# Patient Record
Sex: FEMALE | Race: WHITE | Employment: OTHER | URBAN - METROPOLITAN AREA
[De-identification: names, ages, dates, MRNs, and addresses within clinical notes are randomized per-mention and may not be internally consistent; named-entity substitution may affect disease eponyms.]

---

## 2017-02-24 RX ORDER — FLUOXETINE 10 MG/1
TABLET ORAL
Qty: 90 TAB | Refills: 1 | Status: SHIPPED | OUTPATIENT
Start: 2017-02-24 | End: 2017-11-08 | Stop reason: SDUPTHER

## 2017-02-24 RX ORDER — OMEPRAZOLE 40 MG/1
CAPSULE, DELAYED RELEASE ORAL
Qty: 90 CAP | Refills: 1 | Status: SHIPPED | OUTPATIENT
Start: 2017-02-24 | End: 2018-02-07 | Stop reason: SDUPTHER

## 2017-04-26 ENCOUNTER — OFFICE VISIT (OUTPATIENT)
Dept: INTERNAL MEDICINE CLINIC | Age: 72
End: 2017-04-26

## 2017-04-26 VITALS
TEMPERATURE: 97.8 F | RESPIRATION RATE: 18 BRPM | BODY MASS INDEX: 26.28 KG/M2 | HEIGHT: 61 IN | WEIGHT: 139.2 LBS | HEART RATE: 110 BPM | SYSTOLIC BLOOD PRESSURE: 136 MMHG | OXYGEN SATURATION: 97 % | DIASTOLIC BLOOD PRESSURE: 80 MMHG

## 2017-04-26 DIAGNOSIS — J06.9 ACUTE URI: Primary | ICD-10-CM

## 2017-04-26 RX ORDER — AZITHROMYCIN 250 MG/1
250 TABLET, FILM COATED ORAL SEE ADMIN INSTRUCTIONS
Qty: 6 TAB | Refills: 0 | Status: SHIPPED | OUTPATIENT
Start: 2017-04-26 | End: 2017-05-01

## 2017-04-26 NOTE — PROGRESS NOTES
1. Have you been to the ER, urgent care clinic since your last visit? Hospitalized since your last visit? No    2. Have you seen or consulted any other health care providers outside of the [facility] since your last visit? Include any pap smears or colon screening.  No   Chief Complaint   Patient presents with    Cold Symptoms     x 4 days     Not Fasting

## 2017-04-26 NOTE — MR AVS SNAPSHOT
Visit Information Date & Time Provider Department Dept. Phone Encounter #  
 4/26/2017  4:30 PM Ferdinand [facility] MD [facility] [phone] [id number] Your Appointments 5/19/2017 10:30 AM  
ROUTINE CARE with Ferdinand Kang MD  
[facility] [address]) Appt Note: Follow up visit; Follow up visit; F/U; f/u  
 [place] [address] [facility] [address]  
[facility] [address] [facility] [address] [address] Upcoming Health Maintenance Date Due Hepatitis C Screening 1945 OSTEOPOROSIS SCREENING (DEXA) 12/19/2011 Pneumococcal 65+ Low/Medium Risk (1 of 2 - PCV13) 12/19/2011 MEDICARE YEARLY EXAM 12/19/2011 GLAUCOMA SCREENING Q2Y 6/19/2016 INFLUENZA AGE 9 TO ADULT 8/1/2016 BREAST CANCER SCRN MAMMOGRAM 12/2/2017 COLONOSCOPY 9/30/2020 DTaP/Tdap/Td series (2 - Td) 11/19/2020 Allergies as of 4/26/2017  Review Complete On: 4/26/2017 By: Nadiya Medicine Severity Noted Reaction Type Reactions Hydrocodone  01/17/2016    Nausea and Vomiting Lipitor [Atorvastatin]  02/13/2013    Myalgia Pcn [Penicillins]  10/24/2011    Swelling Hives Simvastatin  10/24/2011    Other (comments) Abdomina pain  
 Sulfa (Sulfonamide Antibiotics)  10/24/2011    Swelling  
 lips Current Immunizations  Reviewed on 11/19/2012 Name Date Influenza Vaccine Split 11/19/2012, 10/24/2011 TDAP Vaccine 11/19/2010 Not reviewed this visit Vitals BP Pulse Temp Resp Height(growth percentile) Weight(growth percentile) 136/80 (BP 1 Location: Left arm, BP Patient Position: At rest) (!) 110 97.8 °F (36.6 °C) (Oral) 18 5' 1\" (1.549 m) 139 lb 3.2 oz (63.1 kg) SpO2 BMI OB Status Smoking Status 97% 26.3 kg/m2 Postmenopausal Never Smoker BMI and BSA Data Body Mass Index Body Surface Area  
 26.3 kg/m 2 1.65 m 2 Preferred Pharmacy Pharmacy Name Phone [address], [facility] [address] Your Updated Medication List  
  
   
This list is accurate as of: 4/26/17  5:10 PM.  Always use your most recent med list.  
  
  
  
  
 azithromycin 250 mg tablet Commonly known as:  Jess Nikolay Take 1 Tab by mouth See Admin Instructions for 5 days. CANASA 1,000 mg suppository Generic drug:  mesalamine Insert  into rectum two (2) times a day. FLUoxetine 10 mg tablet Commonly known as:  PROzac TAKE 1 TABLET ONE TIME DAILY  
  
 fluticasone 50 mcg/actuation nasal spray Commonly known as:  Creasie Cockayne 2 Sprays by Both Nostrils route daily. HYDROmorphone 2 mg tablet Commonly known as:  DILAUDID Take 1 Tab by mouth every three (3) hours as needed for Pain. Max Daily Amount: 16 mg.  
  
 omeprazole 40 mg capsule Commonly known as:  PRILOSEC  
TAKE 1 CAPSULE ONE TIME DAILY  
  
 valACYclovir 1 gram tablet Commonly known as:  VALTREX Take 1 Tab by mouth three (3) times daily. Prescriptions Sent to Pharmacy Refills  
 azithromycin (ZITHROMAX) 250 mg tablet 0 Sig: Take 1 Tab by mouth See Admin Instructions for 5 days. Class: Normal  
 Pharmacy: [address] [address] [address] [place] [address] #: [phone] Route: Oral  
  
Introducing [facility] & HEALTH SERVICES! Dear Shari Grullon: Thank you for requesting a [employer] account. Our records indicate that you have previously registered for a [employer] account but its currently inactive. Please call our [employer] support line at [phone]. Additional Information If you have questions, please visit the Frequently Asked Questions section of the [employer] website at [email or web address] Remember, [employer] is NOT to be used for urgent needs. For medical emergencies, dial 911. Now available from your iPhone and Android! Please provide this summary of care documentation to your next provider. Your primary care clinician is listed as [id number] [address] [place] [address] [address]. If you have any questions after today's visit, please call [phone].

## 2017-04-28 NOTE — PROGRESS NOTES
HISTORY OF PRESENT ILLNESS  Pa Sung is a 80 y.o. female. Cold Symptoms       Here for a cold that started four days ago. She no longer has sore throat and no fever. She is congested and responding to otc meds. Her right ear is popping but no pain. She is going out of town so wants to feel better. Past Medical History:   Diagnosis Date    Allergic rhinitis     CAD (coronary artery disease)     by EBT    Depression     GERD (gastroesophageal reflux disease)     Iron deficiency anemia     Ulcerative colitis (HCC)      Current Outpatient Prescriptions   Medication Sig    azithromycin (ZITHROMAX) 250 mg tablet Take 1 Tab by mouth See Admin Instructions for 5 days.  FLUoxetine (PROZAC) 10 mg tablet TAKE 1 TABLET ONE TIME DAILY    omeprazole (PRILOSEC) 40 mg capsule TAKE 1 CAPSULE ONE TIME DAILY    mesalamine (CANASA) 1,000 mg suppository Insert  into rectum two (2) times a day.  fluticasone (FLONASE) 50 mcg/actuation nasal spray 2 Sprays by Both Nostrils route daily.  HYDROmorphone (DILAUDID) 2 mg tablet Take 1 Tab by mouth every three (3) hours as needed for Pain. Max Daily Amount: 16 mg.    valACYclovir (VALTREX) 1 gram tablet Take 1 Tab by mouth three (3) times daily. No current facility-administered medications for this visit. Review of Systems   All other systems reviewed and are negative. Visit Vitals    /80 (BP 1 Location: Left arm, BP Patient Position: At rest)    Pulse (!) 110    Temp 97.8 °F (36.6 °C) (Oral)    Resp 18    Ht 5' 1\" (1.549 m)    Wt 139 lb 3.2 oz (63.1 kg)    SpO2 97%    BMI 26.3 kg/m2       Physical Exam   Constitutional: She appears well-developed and well-nourished. HENT:   Left Ear: External ear normal.   Mouth/Throat: Oropharynx is clear and moist.   Right TM is dull. Cardiovascular: Normal rate, regular rhythm and normal heart sounds. Pulmonary/Chest: Effort normal and breath sounds normal. No respiratory distress.  She has no wheezes. She has no rales. Psychiatric: She has a normal mood and affect. Her behavior is normal. Judgment and thought content normal.   Nursing note and vitals reviewed. ASSESSMENT and PLAN  Jose Toth was seen today for cold symptoms. Diagnoses and all orders for this visit:    Acute URI  Continue OTC meds for viral syndrome . Use antibiotic if not better in a week. Other orders  -     azithromycin (ZITHROMAX) 250 mg tablet; Take 1 Tab by mouth See Admin Instructions for 5 days.     Reviewed plan of care with the patient who has provided input and agrees with the goals

## 2017-05-19 ENCOUNTER — HOSPITAL ENCOUNTER (OUTPATIENT)
Dept: LAB | Age: 72
Discharge: HOME | End: 2017-05-19
Payer: MEDICARE

## 2017-05-19 ENCOUNTER — OFFICE VISIT (OUTPATIENT)
Dept: INTERNAL MEDICINE CLINIC | Age: 72
End: 2017-05-19

## 2017-05-19 VITALS
SYSTOLIC BLOOD PRESSURE: 143 MMHG | HEIGHT: 61 IN | HEART RATE: 88 BPM | RESPIRATION RATE: 16 BRPM | DIASTOLIC BLOOD PRESSURE: 74 MMHG | OXYGEN SATURATION: 97 % | WEIGHT: 140.2 LBS | BODY MASS INDEX: 26.47 KG/M2 | TEMPERATURE: 98.1 F

## 2017-05-19 DIAGNOSIS — K52.9 IBD (INFLAMMATORY BOWEL DISEASE): ICD-10-CM

## 2017-05-19 DIAGNOSIS — J30.1 NON-SEASONAL ALLERGIC RHINITIS DUE TO POLLEN: ICD-10-CM

## 2017-05-19 DIAGNOSIS — Z78.9 LIVING WILL IN PLACE: ICD-10-CM

## 2017-05-19 DIAGNOSIS — Z13.39 SCREENING FOR ALCOHOLISM: ICD-10-CM

## 2017-05-19 DIAGNOSIS — J00 ACUTE NASOPHARYNGITIS: ICD-10-CM

## 2017-05-19 DIAGNOSIS — Z23 NEED FOR 23-POLYVALENT PNEUMOCOCCAL POLYSACCHARIDE VACCINE: ICD-10-CM

## 2017-05-19 DIAGNOSIS — Z23 NEED FOR VACCINATION WITH 13-POLYVALENT PNEUMOCOCCAL CONJUGATE VACCINE: ICD-10-CM

## 2017-05-19 DIAGNOSIS — K21.9 GASTROESOPHAGEAL REFLUX DISEASE WITHOUT ESOPHAGITIS: ICD-10-CM

## 2017-05-19 DIAGNOSIS — Z00.00 MEDICARE ANNUAL WELLNESS VISIT, SUBSEQUENT: Primary | ICD-10-CM

## 2017-05-19 DIAGNOSIS — F32.9 REACTIVE DEPRESSION: ICD-10-CM

## 2017-05-19 DIAGNOSIS — E78.00 PURE HYPERCHOLESTEROLEMIA: ICD-10-CM

## 2017-05-19 DIAGNOSIS — Z00.00 ROUTINE GENERAL MEDICAL EXAMINATION AT A HEALTH CARE FACILITY: ICD-10-CM

## 2017-05-19 DIAGNOSIS — R94.39 EBT (ELECTRON BEAM TOMOGRAPHY) ABNORMAL: ICD-10-CM

## 2017-05-19 PROBLEM — Z71.89 LIVING WILL, COUNSELING/DISCUSSION: Status: ACTIVE | Noted: 2017-05-19

## 2017-05-19 PROCEDURE — 36415 COLL VENOUS BLD VENIPUNCTURE: CPT

## 2017-05-19 PROCEDURE — 85025 COMPLETE CBC W/AUTO DIFF WBC: CPT

## 2017-05-19 PROCEDURE — 80061 LIPID PANEL: CPT

## 2017-05-19 PROCEDURE — 80053 COMPREHEN METABOLIC PANEL: CPT

## 2017-05-19 NOTE — PROGRESS NOTES
This is a Subsequent Medicare Annual Wellness Visit providing Personalized Prevention Plan Services (PPPS) (Performed 12 months after initial AWV and PPPS )    I have reviewed the patient's medical history in detail and updated the computerized patient record. History   Here for mwv. She needs vaccines. Her mammogram and c-scope are current. She has cad and has not seen cardiology . She is intolerant of asa and does not want statins. Her uc and gerd are stable. She has another cold that started Monday. Her sister was sick. She sees gyn, gi, ophthalmology. Past Medical History:   Diagnosis Date    Allergic rhinitis     CAD (coronary artery disease)     by EBT    Depression     GERD (gastroesophageal reflux disease)     Iron deficiency anemia     Ulcerative colitis ([facility] [address]       Past Surgical History:   Procedure Laterality Date    HX COLONOSCOPY  2013    sigmoid colitis 2018    HX ENDOSCOPY  2013     Current Outpatient Prescriptions   Medication Sig Dispense Refill    FLUoxetine (PROZAC) 10 mg tablet TAKE 1 TABLET ONE TIME DAILY 90 Tab 1    omeprazole (PRILOSEC) 40 mg capsule TAKE 1 CAPSULE ONE TIME DAILY 90 Cap 1    mesalamine (CANASA) 1,000 mg suppository Insert  into rectum two (2) times a day.  fluticasone (FLONASE) 50 mcg/actuation nasal spray 2 Sprays by Both Nostrils route daily.  1 Bottle 11     Allergies   Allergen Reactions    Hydrocodone Nausea and Vomiting    Lipitor [Atorvastatin] Myalgia    Pcn [Penicillins] Swelling     Hives      Simvastatin Other (comments)     Abdomina pain    Sulfa (Sulfonamide Antibiotics) Swelling     lips     Family History   Problem Relation Age of Onset    Heart Disease Mother     Heart Disease Father     Cancer Sister      breast     Social History   Substance Use Topics    Smoking status: Never Smoker    Smokeless tobacco: Never Used    Alcohol use No     Patient Active Problem List   Diagnosis Code    HLD (hyperlipidemia) E78.5    EBT (electron beam tomography) abnormal R94.39    Depression F32.9    Iron deficiency anemia D50.9    IBD (inflammatory bowel disease) K52.9    Allergic rhinitis J30.9    Living will, counseling/discussion Z71.89    Living will in place Z78.9       Depression Risk Factor Screening:   No flowsheet data found. Alcohol Risk Factor Screening: On any occasion during the past 3 months, have you had more than 3 drinks containing alcohol? No    Do you average more than 7 drinks per week? No      Functional Ability and Level of Safety:     Hearing Loss   none    Activities of Daily Living   Self-care. Requires assistance with: no ADLs    Fall Risk     Fall Risk Assessment, last 12 mths 5/19/2017   Able to walk? Yes   Fall in past 12 months? No   Fall with injury? -   Number of falls in past 12 months -   Fall Risk Score -     Abuse Screen   Patient is not abused    Review of Systems   A comprehensive review of systems was negative except for that written in the HPI. Physical Examination     Evaluation of Cognitive Function:  Mood/affect:  happy  Appearance: age appropriate  Family member/caregiver input: none    Visit Vitals    /74 (BP 1 Location: Left arm, BP Patient Position: At rest)    Pulse 88    Temp 98.1 °F (36.7 °C) (Oral)    Resp 16    Ht 5' 1\" (1.549 m)    Wt 140 lb 3.2 oz (63.6 kg)    SpO2 97%    BMI 26.49 kg/m2     General appearance: alert, cooperative, no distress, appears stated age  Lungs: clear to auscultation bilaterally  Heart: regular rate and rhythm, S1, S2 normal, no murmur, click, rub or gallop    Patient Care Team:  Cat Mariano MD as PCP - General (Internal Medicine)    Advice/Referrals/Counseling   Education and counseling provided:  Are appropriate based on today's review and evaluation  End-of-Life planning (with patient's consent)      Assessment/Plan   Leandra Barajas was seen today for cold symptoms and other.     Diagnoses and all orders for this visit:    Medicare annual wellness visit, subsequent    IBD (inflammatory bowel disease)  -     CBC WITH AUTOMATED DIFF  The current medical regimen is effective;  continue present plan and medications. Reactive depression  The current medical regimen is effective;  continue present plan and medications. Non-seasonal allergic rhinitis due to pollen    EBT (electron beam tomography) abnormal  -     Cancel: REFERRAL TO CARDIOLOGY  -     REFERRAL TO CARDIOLOGY    Pure hypercholesterolemia  -     METABOLIC PANEL, COMPREHENSIVE  -     LIPID PANEL    Gastroesophageal reflux disease without esophagitis  The current medical regimen is effective;  continue present plan and medications. Acute nasopharyngitis  Call ten days PRN. Need for vaccination with 13-polyvalent pneumococcal conjugate vaccine  Rx given. Need for 23-polyvalent pneumococcal polysaccharide vaccine    Living will in place    Reviewed plan of care with the patient who has provided input and agrees with the goals  .

## 2017-05-19 NOTE — MR AVS SNAPSHOT
Visit Information Date & Time Provider Department Dept. Phone Encounter #  
 5/19/2017 10:30 AM Isa Dunn MD [facility] [phone] [id number] Follow-up Instructions Return in about 6 months (around 11/19/2017). Upcoming Health Maintenance Date Due Hepatitis C Screening 1945 OSTEOPOROSIS SCREENING (DEXA) 12/19/2011 Pneumococcal 65+ Low/Medium Risk (1 of 2 - PCV13) 12/19/2011 MEDICARE YEARLY EXAM 12/19/2011 GLAUCOMA SCREENING Q2Y 6/19/2016 INFLUENZA AGE 9 TO ADULT 8/1/2017 BREAST CANCER SCRN MAMMOGRAM 12/2/2017 COLONOSCOPY 9/30/2020 DTaP/Tdap/Td series (2 - Td) 11/19/2020 Allergies as of 5/19/2017  Review Complete On: 5/19/2017 By: Angie Canseco Severity Noted Reaction Type Reactions Hydrocodone  01/17/2016    Nausea and Vomiting Lipitor [Atorvastatin]  02/13/2013    Myalgia Pcn [Penicillins]  10/24/2011    Swelling Hives Simvastatin  10/24/2011    Other (comments) Abdomina pain  
 Sulfa (Sulfonamide Antibiotics)  10/24/2011    Swelling  
 lips Current Immunizations  Reviewed on 11/19/2012 Name Date Influenza Vaccine Split 11/19/2012, 10/24/2011 TDAP Vaccine 11/19/2010 Not reviewed this visit You Were Diagnosed With   
  
 Codes Comments Medicare annual wellness visit, subsequent    -  Primary ICD-10-CM: Z00.00 ICD-9-CM: V70.0 IBD (inflammatory bowel disease)     ICD-10-CM: K52.9 ICD-9-CM: 558.9 Reactive depression     ICD-10-CM: F32.9 ICD-9-CM: 300.4 Non-seasonal allergic rhinitis due to pollen     ICD-10-CM: J30.1 ICD-9-CM: 477.0   
 EBT (electron beam tomography) abnormal     ICD-10-CM: R94.39 
ICD-9-CM: 794.39 Pure hypercholesterolemia     ICD-10-CM: E78.00 ICD-9-CM: 272.0 Gastroesophageal reflux disease without esophagitis     ICD-10-CM: K21.9 ICD-9-CM: 530.81 Acute nasopharyngitis     ICD-10-CM: Giulia Selby ICD-9-CM: 958 Need for vaccination with 13-polyvalent pneumococcal conjugate vaccine     ICD-10-CM: K76 ICD-9-CM: V03.82 Need for 23-polyvalent pneumococcal polysaccharide vaccine     ICD-10-CM: U07 ICD-9-CM: V03.82 Living will in place     ICD-10-CM: Z78.9 ICD-9-CM: V49.89 Vitals BP Pulse Temp Resp Height(growth percentile) Weight(growth percentile) 143/74 (BP 1 Location: Left arm, BP Patient Position: At rest) 88 98.1 °F (36.7 °C) (Oral) 16 5' 1\" (1.549 m) 140 lb 3.2 oz (63.6 kg) SpO2 BMI OB Status Smoking Status 97% 26.49 kg/m2 Postmenopausal Never Smoker BMI and BSA Data Body Mass Index Body Surface Area  
 26.49 kg/m 2 1.65 m 2 Preferred Pharmacy Pharmacy Name Phone [address], [address] Your Updated Medication List  
  
   
This list is accurate as of: 5/19/17 10:56 AM.  Always use your most recent med list.  
  
  
  
  
 CANASA 1,000 mg suppository Generic drug:  mesalamine Insert  into rectum two (2) times a day. FLUoxetine 10 mg tablet Commonly known as:  PROzac TAKE 1 TABLET ONE TIME DAILY  
  
 fluticasone 50 mcg/actuation nasal spray Commonly known as:  Creasie Cockayne 2 Sprays by Both Nostrils route daily. omeprazole 40 mg capsule Commonly known as:  PRILOSEC  
TAKE 1 CAPSULE ONE TIME DAILY We Performed the Following CBC WITH AUTOMATED DIFF [68489 CPT(R)] LIPID PANEL [50295 CPT(R)] METABOLIC PANEL, COMPREHENSIVE [44225 CPT(R)] REFERRAL TO CARDIOLOGY [VQS07 Custom] Follow-up Instructions Return in about 6 months (around 11/19/2017). Referral Information Referral ID Referred By Referred To  
  
 [id number] Nabil Donnelly. Esther [address] MD Satish Molina  [address] Phone: [phone] Visits Status Start Date End Date 1 New Request 5/19/17 5/19/18 If your referral has a status of pending review or denied, additional information will be sent to support the outcome of this decision. Introducing [facility] & HEALTH SERVICES! Dear Meg Mary: Thank you for requesting a [employer] account. Our records indicate that you have previously registered for a [employer] account but its currently inactive. Please call our [employer] support line at [phone]. Additional Information If you have questions, please visit the Frequently Asked Questions section of the [employer] website at [email or web address] Remember, [employer] is NOT to be used for urgent needs. For medical emergencies, dial 911. Now available from your iPhone and Android! Please provide this summary of care documentation to your next provider. Your primary care clinician is listed as [id number] [address] [place] [address] [address]. If you have any questions after today's visit, please call [phone].

## 2017-05-19 NOTE — PROGRESS NOTES
1. Have you been to the ER, urgent care clinic since your last visit? Hospitalized since your last visit? No    2. Have you seen or consulted any other health care providers outside of the [address] [place] [address] since your last visit? Include any pap smears or colon screening.  No   Chief Complaint   Patient presents with    Cold Symptoms    Other     follow up     Fasting

## 2017-05-20 LAB
ALBUMIN SERPL-MCNC: 4.2 G/DL (ref 3.5–4.8)
ALBUMIN/GLOB SERPL: 1.6 {RATIO} (ref 1.2–2.2)
ALP SERPL-CCNC: 64 IU/L (ref 39–117)
ALT SERPL-CCNC: 18 IU/L (ref 0–32)
AST SERPL-CCNC: 22 IU/L (ref 0–40)
BASOPHILS # BLD AUTO: 0 X10E3/UL (ref 0–0.2)
BASOPHILS NFR BLD AUTO: 0 %
BILIRUB SERPL-MCNC: 0.4 MG/DL (ref 0–1.2)
BUN SERPL-MCNC: 11 MG/DL (ref 8–27)
BUN/CREAT SERPL: 15 (ref 12–28)
CALCIUM SERPL-MCNC: 9.6 MG/DL (ref 8.7–10.3)
CHLORIDE SERPL-SCNC: 98 MMOL/L (ref 96–106)
CHOLEST SERPL-MCNC: 230 MG/DL (ref 100–199)
CO2 SERPL-SCNC: 25 MMOL/L (ref 18–29)
CREAT SERPL-MCNC: 0.75 MG/DL (ref 0.57–1)
EOSINOPHIL # BLD AUTO: 0.1 X10E3/UL (ref 0–0.4)
EOSINOPHIL NFR BLD AUTO: 1 %
ERYTHROCYTE [DISTWIDTH] IN BLOOD BY AUTOMATED COUNT: 14.2 % (ref 12.3–15.4)
GLOBULIN SER CALC-MCNC: 2.7 G/DL (ref 1.5–4.5)
GLUCOSE SERPL-MCNC: 88 MG/DL (ref 65–99)
HCT VFR BLD AUTO: 41.6 % (ref 34–46.6)
HDLC SERPL-MCNC: 71 MG/DL
HGB BLD-MCNC: 13.4 G/DL (ref 11.1–15.9)
IMM GRANULOCYTES # BLD: 0 X10E3/UL (ref 0–0.1)
IMM GRANULOCYTES NFR BLD: 0 %
INTERPRETATION, 910389: NORMAL
LDLC SERPL CALC-MCNC: 133 MG/DL (ref 0–99)
LYMPHOCYTES # BLD AUTO: 2.1 X10E3/UL (ref 0.7–3.1)
LYMPHOCYTES NFR BLD AUTO: 32 %
MCH RBC QN AUTO: 28.1 PG (ref 26.6–33)
MCHC RBC AUTO-ENTMCNC: 32.2 G/DL (ref 31.5–35.7)
MCV RBC AUTO: 87 FL (ref 79–97)
MONOCYTES # BLD AUTO: 0.6 X10E3/UL (ref 0.1–0.9)
MONOCYTES NFR BLD AUTO: 9 %
NEUTROPHILS # BLD AUTO: 3.9 X10E3/UL (ref 1.4–7)
NEUTROPHILS NFR BLD AUTO: 58 %
PLATELET # BLD AUTO: 282 X10E3/UL (ref 150–379)
POTASSIUM SERPL-SCNC: 4.5 MMOL/L (ref 3.5–5.2)
PROT SERPL-MCNC: 6.9 G/DL (ref 6–8.5)
RBC # BLD AUTO: 4.77 X10E6/UL (ref 3.77–5.28)
SODIUM SERPL-SCNC: 140 MMOL/L (ref 134–144)
TRIGL SERPL-MCNC: 132 MG/DL (ref 0–149)
VLDLC SERPL CALC-MCNC: 26 MG/DL (ref 5–40)
WBC # BLD AUTO: 6.8 X10E3/UL (ref 3.4–10.8)

## 2017-06-13 ENCOUNTER — OFFICE VISIT (OUTPATIENT)
Dept: CARDIOLOGY CLINIC | Age: 72
End: 2017-06-13

## 2017-06-13 VITALS
HEART RATE: 95 BPM | OXYGEN SATURATION: 98 % | RESPIRATION RATE: 16 BRPM | DIASTOLIC BLOOD PRESSURE: 70 MMHG | HEIGHT: 61 IN | SYSTOLIC BLOOD PRESSURE: 130 MMHG | WEIGHT: 137 LBS | BODY MASS INDEX: 25.86 KG/M2

## 2017-06-13 DIAGNOSIS — E78.00 PURE HYPERCHOLESTEROLEMIA: ICD-10-CM

## 2017-06-13 DIAGNOSIS — R00.0 RACING HEART BEAT: ICD-10-CM

## 2017-06-13 DIAGNOSIS — R00.2 PALPITATIONS: ICD-10-CM

## 2017-06-13 DIAGNOSIS — Z00.00 ROUTINE CHECK-UP: ICD-10-CM

## 2017-06-13 DIAGNOSIS — R93.1 AGATSTON CAC SCORE, <100: Primary | ICD-10-CM

## 2017-06-13 NOTE — MR AVS SNAPSHOT
Visit Information Date & Time Provider Department Dept. Phone Encounter #  
 6/13/2017  2:40 PM Sreedhar Bryant MD [facility] [phone] [id number] Follow-up Instructions Return in about 1 year (around 6/13/2018). Upcoming Health Maintenance Date Due Hepatitis C Screening 1945 OSTEOPOROSIS SCREENING (DEXA) 12/19/2011 Pneumococcal 65+ Low/Medium Risk (1 of 2 - PCV13) 12/19/2011 GLAUCOMA SCREENING Q2Y 6/19/2016 INFLUENZA AGE 9 TO ADULT 8/1/2017 BREAST CANCER SCRN MAMMOGRAM 12/2/2017 MEDICARE YEARLY EXAM 5/20/2018 COLONOSCOPY 9/30/2020 DTaP/Tdap/Td series (2 - Td) 11/19/2020 Allergies as of 6/13/2017  Review Complete On: 6/13/2017 By: Sreedhar Bryant MD  
  
 Severity Noted Reaction Type Reactions Aspirin High 06/13/2017    Other (comments) Has ulcerative proctitis Codeine  06/13/2017    Other (comments) Headaches? Hydrocodone  01/17/2016    Nausea and Vomiting Lipitor [Atorvastatin]  02/13/2013    Myalgia Pcn [Penicillins]  10/24/2011    Swelling Hives Simvastatin  10/24/2011    Other (comments) Abdomina pain  
 Sulfa (Sulfonamide Antibiotics)  10/24/2011    Swelling  
 lips Current Immunizations  Reviewed on 11/19/2012 Name Date Influenza Vaccine Split 11/19/2012, 10/24/2011 TDAP Vaccine 11/19/2010 Not reviewed this visit You Were Diagnosed With   
  
 Codes Comments Agatston CAC score, <100    -  Primary ICD-10-CM: R93.1 ICD-9-CM: 793.2 Pure hypercholesterolemia     ICD-10-CM: E78.00 ICD-9-CM: 272.0 Racing heart beat     ICD-10-CM: R00.0 ICD-9-CM: 785.0 Routine check-up     ICD-10-CM: Z00.00 ICD-9-CM: V70.0 Vitals BP Pulse Resp Height(growth percentile) Weight(growth percentile) SpO2  
 130/70 (BP 1 Location: Left arm, BP Patient Position: Sitting) 95 16 5' 1\" (1.549 m) 137 lb (62.1 kg) 98% BMI OB Status Smoking Status 25.89 kg/m2 Postmenopausal Never Smoker Vitals History BMI and BSA Data Body Mass Index Body Surface Area  
 25.89 kg/m 2 1.63 m 2 Preferred Pharmacy Pharmacy Name Phone [address], [address] Your Updated Medication List  
  
   
This list is accurate as of: 6/13/17  3:01 PM.  Always use your most recent med list.  
  
  
  
  
 CANASA 1,000 mg suppository Generic drug:  mesalamine Insert  into rectum two (2) times a day. FLUoxetine 10 mg tablet Commonly known as:  PROzac TAKE 1 TABLET ONE TIME DAILY  
  
 fluticasone 50 mcg/actuation nasal spray Commonly known as:  Fredick [place] 2 Sprays by Both Nostrils route daily. omeprazole 40 mg capsule Commonly known as:  PRILOSEC  
TAKE 1 CAPSULE ONE TIME DAILY We Performed the Following AMB POC EKG ROUTINE W/ 12 LEADS, INTER & REP [23094 CPT(R)] Follow-up Instructions Return in about 1 year (around 6/13/2018). Introducing [facility] & HEALTH SERVICES! Dear Carter Ventura: Thank you for requesting a [employer] account. Our records indicate that you already have an active [employer] account. You can access your account anytime at [email or web address] Did you know that you can access your hospital and ER discharge instructions at any time in [employer]? You can also review all of your test results from your hospital stay or ER visit. Additional Information If you have questions, please visit the Frequently Asked Questions section of the [employer] website at [email or web address] Remember, [employer] is NOT to be used for urgent needs. For medical emergencies, dial 911. Now available from your iPhone and Android! Please provide this summary of care documentation to your next provider. Your primary care clinician is listed as [phone] [address] [place] [address] [address].  If you have any questions after today's visit, please call [phone].

## 2017-06-13 NOTE — PROGRESS NOTES
HISTORY OF PRESENT ILLNESS  Burt Webb is a 80 y.o. female     SUMMARY:   Problem List  Date Reviewed: 6/13/2017          Codes Class Noted    Agatston CAC score, <100 ICD-10-CM: R93.1  ICD-9-CM: 793.2  6/13/2017    Overview Addendum 6/13/2017  6:59 AM by Yann Napoles MD     9/10 calcium score 45, 70th percentile  11/12 normal exercise cardiolyte stress test, lvef 79%               Living will, counseling/discussion ICD-10-CM: Z71.89  ICD-9-CM: V65.49  5/19/2017        Living will in place ICD-10-CM: Z78.9  ICD-9-CM: V49.89  5/19/2017        Allergic rhinitis ICD-10-CM: J30.9  ICD-9-CM: 477.9  Unknown        Depression ICD-10-CM: F32.9  ICD-9-CM: 129  1/28/2013        Iron deficiency anemia ICD-10-CM: D50.9  ICD-9-CM: 280.9  1/28/2013        IBD (inflammatory bowel disease) ICD-10-CM: K52.9  ICD-9-CM: 558.9  1/28/2013        HLD (hyperlipidemia) ICD-10-CM: E78.5  ICD-9-CM: 272.4  11/20/2012              Current Outpatient Prescriptions on File Prior to Visit   Medication Sig    FLUoxetine (PROZAC) 10 mg tablet TAKE 1 TABLET ONE TIME DAILY    omeprazole (PRILOSEC) 40 mg capsule TAKE 1 CAPSULE ONE TIME DAILY (Patient taking differently: TAKE 1 CAPSULE ONE TIME DAILY/ taking differently only as needed for spicey or fried food)    mesalamine (CANASA) 1,000 mg suppository Insert  into rectum two (2) times a day.  fluticasone (FLONASE) 50 mcg/actuation nasal spray 2 Sprays by Both Nostrils route daily. No current facility-administered medications on file prior to visit. CARDIOLOGY STUDIES TO DATE:  9/10 calcium score 45, 70th percentile  11/12 normal exercise cardiolyte stress test, lvef 79%      Chief Complaint   Patient presents with    Abnormal Cardiac Test     HPI :  Ms. Rosario Dangelo is a 80year old female referred by Amanda Pratt MD for cardiac evaluation. Her past cardiac testing has been reviewed and is summarized at the top of this page.  For the last couple of months she has had three or four episodes of palpitations that have occurred usually late in the afternoon when she is sitting still. These last for no more than ten minutes, and they are not associated with any other symptoms. She thinks it may be anxiety related because she and her  run a small business. She does not exercise, but she is very active working in her yard and around her house and goes up and down three flights of stairs multiple times per day with no symptoms suggestive of angina or heart failure. She has hypertension and diabetes. She has never smoked, and family history is negative for premature coronary disease. Her most recent lipid profile from last month showed an HDL of 71 and an LDL of 133, and she has been resistant to statin therapy when suggested in the past. She cannot take aspirin because it causes bleeding from her intestines. EKG today shows normal sinus rhythm, normal intervals and axis, and no acute ST-T wave changes.      CARDIAC ROS:   negative for chest pain, dyspnea, syncope, orthopnea, paroxysmal nocturnal dyspnea, exertional chest pressure/discomfort, claudication, lower extremity edema    Family History   Problem Relation Age of Onset    Heart Disease Mother      heart attack in her [de-identified]    Heart Disease Father      cabg late 57's    Cancer Sister      breast       Past Medical History:   Diagnosis Date    Allergic rhinitis     CAD (coronary artery disease)     by EBT    Depression     GERD (gastroesophageal reflux disease)     Iron deficiency anemia     Ulcerative colitis ([facility] [address]        GENERAL ROS:  A comprehensive review of systems was negative except for: Gastrointestinal: positive for reflux symptoms    Visit Vitals    /70 (BP 1 Location: Left arm, BP Patient Position: Sitting)    Pulse 95    Resp 16    Ht 5' 1\" (1.549 m)    Wt 137 lb (62.1 kg)    SpO2 98%    BMI 25.89 kg/m2       Wt Readings from Last 3 Encounters:   06/13/17 137 lb (62.1 kg)   05/19/17 140 lb 3.2 oz (63.6 kg)   04/26/17 139 lb 3.2 oz (63.1 kg)            BP Readings from Last 3 Encounters:   06/13/17 130/70   05/19/17 143/74   04/26/17 136/80       PHYSICAL EXAM  General appearance: alert, cooperative, no distress, appears stated age  Neurologic: Alert and oriented X 3  Neck: supple, symmetrical, trachea midline, no adenopathy, no carotid bruit and no JVD  Lungs: clear to auscultation bilaterally  Heart: regular rate and rhythm, S1, S2 normal, no murmur, click, rub or gallop  Abdomen: soft, non-tender. Bowel sounds normal. No masses,  no organomegaly  Extremities: extremities normal, atraumatic, no cyanosis or edema  Pulses: 2+ and symmetric    Lab Results   Component Value Date/Time    Cholesterol, total 230 05/19/2017 11:10 AM    Cholesterol, total 277 11/23/2015 11:37 AM    Cholesterol, total 283 10/06/2014 11:28 AM    Cholesterol, total 288 10/01/2013 11:44 AM    Cholesterol, total 207 04/03/2013 01:54 PM    HDL Cholesterol 71 05/19/2017 11:10 AM    HDL Cholesterol 81 11/23/2015 11:37 AM    HDL Cholesterol 78 10/06/2014 11:28 AM    HDL Cholesterol 75 10/01/2013 11:44 AM    HDL Cholesterol 73 04/03/2013 01:54 PM    LDL, calculated 133 05/19/2017 11:10 AM    LDL, calculated 177 11/23/2015 11:37 AM    LDL, calculated 181 10/06/2014 11:28 AM    LDL, calculated 184 10/01/2013 11:44 AM    LDL, calculated 109 04/03/2013 01:54 PM    Triglyceride 132 05/19/2017 11:10 AM    Triglyceride 94 11/23/2015 11:37 AM    Triglyceride 118 10/06/2014 11:28 AM    Triglyceride 143 10/01/2013 11:44 AM    Triglyceride 126 04/03/2013 01:54 PM     ASSESSMENT  Ms. Darby Goldstein certainly has ongoing risk factors, primarily hyperlipidemia. She has a high HDL as well. She will consider a statin, but in the meantime she wants to start walking, and I think she needs to lose about ten pounds which is reasonable, though I told her in six months if her LDL is not down around 100 she should take a statin based on her calcium score.  I also think it is time to re-image her, so we are going to set her up for a stress echo. In terms of the palpitations these sound quite benign at this point, unless they become more frequent, prolonged or bothersome or they are associated with symptoms such as chest pain, shortness of breath or dizziness I do not think she needs a monitor or any testing in that regard, and we went over all of that in detail. current treatment plan is effective, no change in therapy  lab results and schedule of future lab studies reviewed with patient  reviewed diet, exercise and weight control    Encounter Diagnoses   Name Primary?  Agatston CAC score, <100 Yes    Pure hypercholesterolemia     Racing heart beat     Routine check-up     Palpitations      Orders Placed This Encounter    AMB POC EKG ROUTINE W/ 12 LEADS, INTER & REP       Follow-up Disposition:  Return in about 1 year (around 6/13/2018).     Dee Pugh MD  6/13/2017

## 2017-07-07 ENCOUNTER — CLINICAL SUPPORT (OUTPATIENT)
Dept: CARDIOLOGY CLINIC | Age: 72
End: 2017-07-07

## 2017-07-07 DIAGNOSIS — R00.2 HEART PALPITATIONS: ICD-10-CM

## 2017-07-07 DIAGNOSIS — R93.1 AGATSTON CAC SCORE, <100: Primary | ICD-10-CM

## 2017-07-17 ENCOUNTER — OFFICE VISIT (OUTPATIENT)
Dept: INTERNAL MEDICINE CLINIC | Age: 72
End: 2017-07-17

## 2017-07-17 VITALS
HEIGHT: 61 IN | HEART RATE: 94 BPM | RESPIRATION RATE: 18 BRPM | DIASTOLIC BLOOD PRESSURE: 80 MMHG | OXYGEN SATURATION: 96 % | WEIGHT: 136.6 LBS | TEMPERATURE: 97.8 F | BODY MASS INDEX: 25.79 KG/M2 | SYSTOLIC BLOOD PRESSURE: 130 MMHG

## 2017-07-17 DIAGNOSIS — B88.9 CHIGGERS (MITES): Primary | ICD-10-CM

## 2017-07-17 NOTE — PROGRESS NOTES
1. Have you been to the ER, urgent care clinic since your last visit? No   Hospitalized since your last visit? No      2. Have you seen or consulted any other health care providers outside of the [facility] since your last visit? Include any pap smears or colon screening.  Yes      Chief Complaint   Patient presents with    Rash     possible shingles     Not fasting

## 2017-07-17 NOTE — PROGRESS NOTES
HISTORY OF PRESENT ILLNESS  Padmaja Mancini is a 80 y.o. female. HPI  Here for skin itching over the weekend. She has a rash on her lower back. She has itching here, on her neck and left arm. She noted some stinging on her left face. Past Medical History:   Diagnosis Date    Allergic rhinitis     CAD (coronary artery disease)     by EBT    Depression     GERD (gastroesophageal reflux disease)     Iron deficiency anemia     Ulcerative colitis (HCC)      Current Outpatient Prescriptions   Medication Sig    FLUoxetine (PROZAC) 10 mg tablet TAKE 1 TABLET ONE TIME DAILY    omeprazole (PRILOSEC) 40 mg capsule TAKE 1 CAPSULE ONE TIME DAILY (Patient taking differently: TAKE 1 CAPSULE ONE TIME DAILY/ taking differently only as needed for spicey or fried food)    mesalamine (CANASA) 1,000 mg suppository Insert  into rectum two (2) times a day.  fluticasone (FLONASE) 50 mcg/actuation nasal spray 2 Sprays by Both Nostrils route daily. No current facility-administered medications for this visit. Review of Systems   All other systems reviewed and are negative. Visit Vitals    /80 (BP 1 Location: Left arm, BP Patient Position: At rest)    Pulse 94    Temp 97.8 °F (36.6 °C) (Oral)    Resp 18    Ht 5' 1\" (1.549 m)    Wt 136 lb 9.6 oz (62 kg)    SpO2 96%    BMI 25.81 kg/m2       Physical Exam   Constitutional: She appears well-developed and well-nourished. Neck: No thyromegaly present. Lymphadenopathy:     She has no cervical adenopathy. Skin: Rash noted. Patch of erythematous papules on lower back. Otherwise skin clear. Nursing note and vitals reviewed. ASSESSMENT and PLAN  Sammi Alegre was seen today for rash. Diagnoses and all orders for this visit:    Chiggers (mites)  Continue benadryl and calamine. OV PRN.     Reviewed plan of care with the patient who has provided input and agrees with the goals

## 2017-11-08 RX ORDER — FLUOXETINE 10 MG/1
TABLET ORAL
Qty: 90 TAB | Refills: 1 | Status: SHIPPED | OUTPATIENT
Start: 2017-11-08 | End: 2018-03-14 | Stop reason: SDUPTHER

## 2018-02-07 RX ORDER — OMEPRAZOLE 40 MG/1
CAPSULE, DELAYED RELEASE ORAL
Qty: 90 CAP | Refills: 1 | Status: SHIPPED | OUTPATIENT
Start: 2018-02-07 | End: 2018-07-11 | Stop reason: SDUPTHER

## 2018-02-28 ENCOUNTER — TELEPHONE (OUTPATIENT)
Dept: INTERNAL MEDICINE CLINIC | Age: 73
End: 2018-02-28

## 2018-03-14 ENCOUNTER — OFFICE VISIT (OUTPATIENT)
Dept: INTERNAL MEDICINE CLINIC | Age: 73
End: 2018-03-14

## 2018-03-14 VITALS
SYSTOLIC BLOOD PRESSURE: 137 MMHG | DIASTOLIC BLOOD PRESSURE: 72 MMHG | WEIGHT: 140 LBS | TEMPERATURE: 98.1 F | HEIGHT: 61 IN | RESPIRATION RATE: 18 BRPM | OXYGEN SATURATION: 97 % | HEART RATE: 81 BPM | BODY MASS INDEX: 26.43 KG/M2

## 2018-03-14 DIAGNOSIS — D50.9 IRON DEFICIENCY ANEMIA, UNSPECIFIED IRON DEFICIENCY ANEMIA TYPE: ICD-10-CM

## 2018-03-14 DIAGNOSIS — K52.9 IBD (INFLAMMATORY BOWEL DISEASE): ICD-10-CM

## 2018-03-14 DIAGNOSIS — J30.89 CHRONIC NONSEASONAL ALLERGIC RHINITIS DUE TO POLLEN: ICD-10-CM

## 2018-03-14 DIAGNOSIS — Z78.0 POSTMENOPAUSAL: Primary | ICD-10-CM

## 2018-03-14 DIAGNOSIS — E78.00 PURE HYPERCHOLESTEROLEMIA: ICD-10-CM

## 2018-03-14 RX ORDER — CETIRIZINE HCL 10 MG
10 TABLET ORAL
COMMUNITY
Start: 2018-03-14 | End: 2018-10-12 | Stop reason: ALTCHOICE

## 2018-03-14 RX ORDER — FLUOXETINE 10 MG/1
TABLET ORAL
Qty: 90 TAB | Refills: 1 | Status: SHIPPED | OUTPATIENT
Start: 2018-03-14 | End: 2018-07-11 | Stop reason: SDUPTHER

## 2018-03-14 RX ORDER — METHYLPREDNISOLONE 4 MG/1
TABLET ORAL
Qty: 1 DOSE PACK | Refills: 0 | Status: SHIPPED | OUTPATIENT
Start: 2018-03-14 | End: 2018-04-04 | Stop reason: ALTCHOICE

## 2018-03-14 NOTE — PROGRESS NOTES
HISTORY OF PRESENT ILLNESS  Darcie Ann is a 71 y.o. female. Chief Complaint   Patient presents with    Ear Pain     about 1 month     HPI  1) R Ear pain - at first pressure only - getting worse now. Now starting L ear as well. Recent Nasal congestion - has allergies. Taking Flonase, but no other allergy medication. Using proper technique    2) HM - having DEXA and Mammograms done elsewhere. Reports that she is UTD. Health Maintenance Due   Topic Date Due    Bone Densitometry (Dexa) Screening  12/19/2011    GLAUCOMA SCREENING Q2Y  06/19/2016    BREAST CANCER SCRN MAMMOGRAM  12/02/2017     3) Hx Hyperchol - due for labs in May. Needs order. 4) Hx IBD and hx anemia - UTD with GI. GI started pt on Prozac years ago and pt notes she is doing well with this low dose. Denies anx/dep. Needs refill. Review of Systems   Constitutional: Positive for malaise/fatigue. Negative for fever. HENT: Positive for congestion and ear pain. Eyes: Positive for discharge. Respiratory: Negative for cough, sputum production and shortness of breath. Cardiovascular: Negative for chest pain. Skin: Negative for rash. Neurological: Positive for headaches. Endo/Heme/Allergies: Positive for environmental allergies. Psychiatric/Behavioral: Negative for depression. The patient is not nervous/anxious. Physical Exam   Constitutional: She is oriented to person, place, and time. She appears well-developed and well-nourished. No distress. HENT:   Head: Normocephalic and atraumatic. Mouth/Throat: Oropharynx is clear and moist.   Bilateral TMs with fluid. No erythema. Nasal mucosa pale, inflamed. Eyes: Conjunctivae are normal.   Neck: Neck supple. Cardiovascular: Normal rate, regular rhythm and normal heart sounds. Pulmonary/Chest: Effort normal and breath sounds normal.   Lymphadenopathy:     She has no cervical adenopathy. Neurological: She is alert and oriented to person, place, and time. Skin: Skin is warm and dry. Psychiatric: She has a normal mood and affect. Her behavior is normal. Judgment and thought content normal.   Nursing note and vitals reviewed. ASSESSMENT and PLAN    ICD-10-CM ICD-9-CM    1. Postmenopausal Z78.0 V49.81 DEXA BONE DENSITY STUDY AXIAL   2. Pure hypercholesterolemia B51.00 419.9 METABOLIC PANEL, COMPREHENSIVE      LIPID PANEL   3. IBD (inflammatory bowel disease) K52.9 558.9 CBC WITH AUTOMATED DIFF      FLUoxetine (PROZAC) 10 mg tablet   4. Iron deficiency anemia, unspecified iron deficiency anemia type D50.9 280.9 CBC WITH AUTOMATED DIFF      IRON PROFILE   5.  Chronic nonseasonal allergic rhinitis due to pollen J30.89 477.0 methylPREDNISolone (MEDROL DOSEPACK) 4 mg tablet      cetirizine (ZYRTEC) 10 mg tablet

## 2018-03-14 NOTE — MR AVS SNAPSHOT
[address] [place] [address] [address] 
[phone] Patient: Purvi Daily MRN: [id number] :1945 Visit Information Date & Time Provider Department Dept. Phone Encounter #  
 3/14/2018 10:45 AM Suraj Hidalgo PA-C [facility] [phone] [id number] Follow-up Instructions Return in about 6 months (around 2018) for Cholesterol, Medication follow up. Your Appointments 2018 10:45 AM  
Medicare Physical with Suraj Hidalgo PA-C [facility] ([facility]) Appt Note: Dr. Luciana Malloy [address] [facility] [address]  
[facility] [address] [facility] [address] [phone] 10:20 AM  
ESTABLISHED PATIENT with Jenn Pendleton MD  
[facility] ([facility]) Appt Note: 1 yr f/u  
 [address] [facility] [address]  
[address] [address] Upcoming Health Maintenance Date Due Hepatitis C Screening 1945 Bone Densitometry (Dexa) Screening 2011 GLAUCOMA SCREENING Q2Y 2016 BREAST CANCER SCRN MAMMOGRAM 2017 MEDICARE YEARLY EXAM 2018 Pneumococcal 65+ Low/Medium Risk (2 of 2 - PPSV23) 3/14/2019 COLONOSCOPY 2020 DTaP/Tdap/Td series (2 - Td) 2020 Allergies as of 3/14/2018  Review Complete On: 3/14/2018 By: Suraj Hidalgo PA-C Severity Noted Reaction Type Reactions Aspirin High 2017    Other (comments) Has ulcerative proctitis Codeine  2017    Other (comments) Headaches? Hydrocodone  2016    Nausea and Vomiting Lipitor [Atorvastatin]  2013    Myalgia Pcn [Penicillins]  10/24/2011    Swelling Hives Simvastatin  10/24/2011    Other (comments) Abdomina pain Sulfa (Sulfonamide Antibiotics)  10/24/2011    Swelling  
 lips Current Immunizations  Reviewed on 11/19/2012 Name Date Influenza Vaccine Split 11/19/2012, 10/24/2011 TDAP Vaccine 11/19/2010 Not reviewed this visit You Were Diagnosed With   
  
 Codes Comments Postmenopausal    -  Primary ICD-10-CM: Z78.0 ICD-9-CM: V49.81 Pure hypercholesterolemia     ICD-10-CM: E78.00 ICD-9-CM: 272.0 IBD (inflammatory bowel disease)     ICD-10-CM: K52.9 ICD-9-CM: 558.9 Iron deficiency anemia, unspecified iron deficiency anemia type     ICD-10-CM: D50.9 ICD-9-CM: 280. 9 Chronic nonseasonal allergic rhinitis due to pollen     ICD-10-CM: J30.89 ICD-9-CM: 477.0 Vitals BP Pulse Temp Resp Height(growth percentile) Weight(growth percentile)  
 137/72 (BP 1 Location: Left arm, BP Patient Position: At rest) 81 98.1 °F (36.7 °C) (Oral) 18 5' 1\" (1.549 m) 140 lb (63.5 kg) SpO2 BMI OB Status Smoking Status 97% 26.45 kg/m2 Postmenopausal Never Smoker BMI and BSA Data Body Mass Index Body Surface Area  
 26.45 kg/m 2 1.65 m 2 Preferred Pharmacy Pharmacy Name Phone [address], [facility] [address] Your Updated Medication List  
  
   
This list is accurate as of 3/14/18 11:17 AM.  Always use your most recent med list.  
  
  
  
  
 CANASA 1,000 mg suppository Generic drug:  mesalamine Insert  into rectum two (2) times a day. cetirizine 10 mg tablet Commonly known as:  ZYRTEC Take 1 Tab by mouth nightly. FLUoxetine 10 mg tablet Commonly known as:  PROzac TAKE 1 TABLET EVERY DAY  
  
 fluticasone 50 mcg/actuation nasal spray Commonly known as:  Magdalena Frederic 2 Sprays by Both Nostrils route daily. methylPREDNISolone 4 mg tablet Commonly known as:  Lordandre Diones Take as directed  
  
 omeprazole 40 mg capsule Commonly known as:  PRILOSEC  
 TAKE 1 CAPSULE EVERY DAY Prescriptions Sent to Pharmacy Refills  
 methylPREDNISolone (MEDROL DOSEPACK) 4 mg tablet 0 Sig: Take as directed Class: Normal  
 Pharmacy: [address] [place] [address] Ph #: [phone] FLUoxetine (PROZAC) 10 mg tablet 1 Sig: TAKE 1 TABLET EVERY DAY Class: Normal  
 Pharmacy: [address] Ph #: [phone] Follow-up Instructions Return in about 6 months (around 9/14/2018) for Cholesterol, Medication follow up. To-Do List   
 03/14/2018 Imaging:  DEXA BONE DENSITY STUDY AXIAL Around 03/17/2018 Lab:  CBC WITH AUTOMATED DIFF Around 03/17/2018 Lab:  IRON PROFILE Around 03/17/2018 Lab:  LIPID PANEL Around 03/17/2018 Lab:  METABOLIC PANEL, COMPREHENSIVE [facility]! Dear Devang Flores: Thank you for requesting a Canvas account. Our records indicate that you already have an active Canvas account. You can access your account anytime at [email or web address] Did you know that you can access your hospital and ER discharge instructions at any time in Canvas? You can also review all of your test results from your hospital stay or ER visit. Additional Information If you have questions, please visit the Frequently Asked Questions section of the Canvas website at [email or web address] Remember, Canvas is NOT to be used for urgent needs. For medical emergencies, dial 911. Now available from your iPhone and Android! Please provide this summary of care documentation to your next provider. Your primary care clinician is listed as [id number] [address] [place] [address] [address]. If you have any questions after today's visit, please call [phone].

## 2018-03-14 NOTE — PROGRESS NOTES
1. Have you been to the ER, urgent care clinic since your last visit? Hospitalized since your last visit? No    2. Have you seen or consulted any other health care providers outside of the [facility] since your last visit? Include any pap smears or colon screening.  No   Chief Complaint   Patient presents with    Ear Pain     about 1 month     Not fasting

## 2018-03-17 DIAGNOSIS — K52.9 IBD (INFLAMMATORY BOWEL DISEASE): ICD-10-CM

## 2018-03-17 DIAGNOSIS — E78.00 PURE HYPERCHOLESTEROLEMIA: ICD-10-CM

## 2018-03-17 DIAGNOSIS — D50.9 IRON DEFICIENCY ANEMIA, UNSPECIFIED IRON DEFICIENCY ANEMIA TYPE: ICD-10-CM

## 2018-04-04 ENCOUNTER — OFFICE VISIT (OUTPATIENT)
Dept: FAMILY MEDICINE CLINIC | Age: 73
End: 2018-04-04

## 2018-04-04 VITALS
WEIGHT: 139 LBS | DIASTOLIC BLOOD PRESSURE: 81 MMHG | SYSTOLIC BLOOD PRESSURE: 131 MMHG | TEMPERATURE: 97.5 F | RESPIRATION RATE: 18 BRPM | HEIGHT: 61 IN | HEART RATE: 90 BPM | BODY MASS INDEX: 26.24 KG/M2 | OXYGEN SATURATION: 96 %

## 2018-04-04 DIAGNOSIS — B37.31 VAGINAL CANDIDA: ICD-10-CM

## 2018-04-04 DIAGNOSIS — N30.01 ACUTE CYSTITIS WITH HEMATURIA: Primary | ICD-10-CM

## 2018-04-04 DIAGNOSIS — R30.0 DYSURIA: ICD-10-CM

## 2018-04-04 DIAGNOSIS — R82.90 ABNORMAL URINALYSIS: ICD-10-CM

## 2018-04-04 LAB
BILIRUB UR QL STRIP: NEGATIVE
GLUCOSE UR-MCNC: NEGATIVE MG/DL
KETONES P FAST UR STRIP-MCNC: NEGATIVE MG/DL
PH UR STRIP: 5.5 [PH] (ref 4.6–8)
PROT UR QL STRIP: NEGATIVE
SP GR UR STRIP: 1.01 (ref 1–1.03)
UA UROBILINOGEN AMB POC: ABNORMAL (ref 0.2–1)
URINALYSIS CLARITY POC: CLEAR
URINALYSIS COLOR POC: YELLOW
URINE BLOOD POC: ABNORMAL
URINE LEUKOCYTES POC: ABNORMAL
URINE NITRITES POC: NEGATIVE

## 2018-04-04 RX ORDER — AZELASTINE 1 MG/ML
SPRAY, METERED NASAL
COMMUNITY
Start: 2018-03-28 | End: 2018-10-12

## 2018-04-04 RX ORDER — FLUCONAZOLE 150 MG/1
150 TABLET ORAL DAILY
Qty: 1 TAB | Refills: 0 | Status: SHIPPED | OUTPATIENT
Start: 2018-04-04 | End: 2018-04-05

## 2018-04-04 RX ORDER — EPINEPHRINE 0.3 MG/.3ML
INJECTION SUBCUTANEOUS
COMMUNITY
Start: 2018-03-28 | End: 2022-05-07

## 2018-04-04 RX ORDER — NITROFURANTOIN 25; 75 MG/1; MG/1
100 CAPSULE ORAL 2 TIMES DAILY
Qty: 14 CAP | Refills: 0 | Status: SHIPPED | OUTPATIENT
Start: 2018-04-04 | End: 2018-04-11

## 2018-04-04 NOTE — PROGRESS NOTES
Subjective:   71 y.o. female complains of white, creamy, scant, thin, vaginal erythema noted and vulvar erythema noted vaginal discharge for 14 days. .  Denies abnormal vaginal bleeding or significant pelvic pain or  fever. Mild to moderate burning all the time, worse with urination. Denies low pelvic pain, although she has ulcerative colitis and has chronic discomfort and pain. She has had some urinary frequency. Denies history of known exposure to STD. No LMP recorded. Patient is postmenopausal.    Objective:   She appears well, afebrile. Abdomen: benign, soft, nontender, no masses. No CVA tenderness. Pelvic Exam: exam declined by the patient. Urine dipstick:  positive for RBC's and positive for leukocytes. .  Culture and Nu Swab sent to the lab  Assessment/Plan:   monilia vaginitis  UTI  Nu Swab sent to lab. Possibly she may need treatment for BV, I have discussed options for treatment and she has elected to start Macrobid and diflucan first, and will be called if any changes need to be made pending results from the lab. Treatment: abstain from coitus during course of treatment  ROV prn if symptoms persist or worsen. ICD-10-CM ICD-9-CM    1. Acute cystitis with hematuria N30.01 595.0 nitrofurantoin, macrocrystal-monohydrate, (MACROBID) 100 mg capsule      REFERRAL TO FAMILY PRACTICE   2. Dysuria R30.0 788.1 AMB POC URINALYSIS DIP STICK AUTO W/O MICRO      nitrofurantoin, macrocrystal-monohydrate, (MACROBID) 100 mg capsule      NUSWAB VAGINITIS PLUS (VG+) WITH CANDIDA (SIX SPECIES)      REFERRAL TO FAMILY PRACTICE   3. Abnormal urinalysis R82.90 791.9 CULTURE, URINE      nitrofurantoin, macrocrystal-monohydrate, (MACROBID) 100 mg capsule      NUSWAB VAGINITIS PLUS (VG+) WITH CANDIDA (SIX SPECIES)      REFERRAL TO FAMILY PRACTICE      CANCELED: CULTURE, URINE   4.  Vaginal candida B37.3 112.1 fluconazole (DIFLUCAN) 150 mg tablet      NUSWAB VAGINITIS PLUS (VG+) WITH CANDIDA (SIX SPECIES)      REFERRAL TO FAMILY PRACTICE   .

## 2018-04-04 NOTE — PATIENT INSTRUCTIONS
Candidiasis: Care Instructions  Your Care Instructions  Candidiasis (say \"hoc-psm-FK-uh-refugio\") is a yeast infection. Yeast normally lives in your body. But it can cause problems if your body's defenses don't work as they should. Some medicines can increase your chance of getting a yeast infection. These include antibiotics, steroids, and cancer drugs. And some diseases like AIDS and diabetes can make you more likely to get yeast infections. There are different types of yeast infections. Liam Sherman is a yeast infection in the mouth. It usually occurs in people with weak immune systems. It causes white patches inside the mouth and throat. Yeast infections of the skin usually occur in skin folds where the skin stays moist. They cause red, oozing patches on your skin. Babies can get these infections under the diaper. People who often wear gloves can get them on their hands. Many women get vaginal yeast infections. They are most common when women take antibiotics. These infections can cause the vagina to itch and burn. They also cause white discharge that looks like cottage cheese. In rare cases, yeast infects the blood. This can cause serious disease. This kind of infection is treated with medicine given through a needle into a vein (IV). After you start treatment, a yeast infection usually goes away quickly. But if your immune system is weak, the infection may come back. Tell your doctor if you get yeast infections often. Follow-up care is a key part of your treatment and safety. Be sure to make and go to all appointments, and call your doctor if you are having problems. It's also a good idea to know your test results and keep a list of the medicines you take. How can you care for yourself at home? · Take your medicines exactly as prescribed. Call your doctor if you think you are having a problem with your medicine. · Use antibiotics only as directed by your doctor. · Eat yogurt with live cultures.  It has bacteria called lactobacillus. It may help prevent some types of yeast infections. · Keep your skin clean and dry. Put powder on moist places. · If you are using a cream or suppository to treat a vaginal yeast infection, don't use condoms or a diaphragm. Use a different type of birth control. · Eat a healthy diet and get regular exercise. This will help keep your immune system strong. When should you call for help? Watch closely for changes in your health, and be sure to contact your doctor if:  ? · You do not get better as expected. Where can you learn more? Go to http://daniela-johs.info/. Enter L150 in the search box to learn more about \"Candidiasis: Care Instructions. \"  Current as of: October 13, 2016  Content Version: 11.4  © 1274-0129 [employer]. Care instructions adapted under license by [employer] (which disclaims liability or warranty for this information). If you have questions about a medical condition or this instruction, always ask your healthcare professional. [address] any warranty or liability for your use of this information.

## 2018-04-04 NOTE — MR AVS SNAPSHOT
[address] [facility] 
 
 
 [address] [facility], [facility] 104 1400 [address] [place], [place] [phone] Patient: Darwin Dia MRN: [id number] :1945 Visit Information Date & Time Provider Department Dept. Phone Encounter #  
 2018 11:00 AM Meghana Anguiano, [address] [facility] [phone] [id number] Follow-up Instructions Return if symptoms worsen or fail to improve. Your Appointments 5/15/2018  1:30 PM  
New Patient with Victorino Perry MD  
[facility] [address] Appt Note: est pcp - Dr. Sandra Salvador pt  
 Xu Reece 116 [facility] 99 [address] 41  
  
   
 [address] 94 [id number]  
  
    
 2018 10:00 AM  
PHYSICAL PRE OP with Luisa Mercado MD  
[address] [place] [address]) Appt Note: np est pcp ks 18  
 320 [facility] [address]  
[phone]  
  
   
 [address] [facility] [address] [address]  
  
    
 2018 10:20 AM  
ESTABLISHED PATIENT with Yue Best MD  
[facility] ([facility]) Appt Note: 1 yr f/u  
 [address] [facility] [address] [facility] 7 [phone] Upcoming Health Maintenance Date Due Bone Densitometry (Dexa) Screening 2011 GLAUCOMA SCREENING Q2Y 2016 BREAST CANCER SCRN MAMMOGRAM 2017 MEDICARE YEARLY EXAM 2018 Pneumococcal 65+ Low/Medium Risk (2 of 2 - PPSV23) 3/14/2019 COLONOSCOPY 2020 DTaP/Tdap/Td series (2 - Td) 2020 Allergies as of 2018  Review Complete On: 2018 By: Nicko Auguste LPN Severity Noted Reaction Type Reactions Aspirin High 2017    Other (comments) Has ulcerative proctitis Codeine  2017    Other (comments) Headaches? Hydrocodone  2016    Nausea and Vomiting Lipitor [Atorvastatin]  02/13/2013    Myalgia Pcn [Penicillins]  10/24/2011    Swelling Hives Simvastatin  10/24/2011    Other (comments) Abdomina pain  
 Sulfa (Sulfonamide Antibiotics)  10/24/2011    Swelling  
 lips Current Immunizations  Reviewed on 11/19/2012 Name Date Influenza Vaccine Split 11/19/2012, 10/24/2011 TDAP Vaccine 11/19/2010 Not reviewed this visit You Were Diagnosed With   
  
 Codes Comments Acute cystitis with hematuria    -  Primary ICD-10-CM: N30.01 
ICD-9-CM: 595.0 Dysuria     ICD-10-CM: R30.0 ICD-9-CM: 035. 1 Abnormal urinalysis     ICD-10-CM: R82.90 ICD-9-CM: 791.9 Vaginal candida     ICD-10-CM: B37.3 ICD-9-CM: 112.1 Vitals BP Pulse Temp Resp Height(growth percentile) Weight(growth percentile) 131/81 90 97.5 °F (36.4 °C) (Oral) 18 5' 1\" (1.549 m) 139 lb (63 kg) SpO2 BMI OB Status Smoking Status 96% 26.26 kg/m2 Postmenopausal Never Smoker Vitals History BMI and BSA Data Body Mass Index Body Surface Area  
 26.26 kg/m 2 1.65 m 2 Preferred Pharmacy Pharm[facility] Phone [facility] [address] Your Updated Medication List  
  
   
This list is accurate as of 4/4/18 11:47 AM.  Always use your most recent med list.  
  
  
  
  
 azelastine 137 mcg (0.1 %) nasal spray Commonly known as:  ASTELIN  
  
 CANASA 1,000 mg suppository Generic drug:  mesalamine Insert  into rectum two (2) times a day. cetirizine 10 mg tablet Commonly known as:  ZYRTEC Take 1 Tab by mouth nightly. EPINEPHrine 0.3 mg/0.3 mL injection Commonly known as:  EPIPEN  
  
 fluconazole 150 mg tablet Commonly known as:  DIFLUCAN Take 1 Tab by mouth daily for 1 day. FDA advises cautious prescribing of oral fluconazole in pregnancy. FLUoxetine 10 mg tablet Commonly known as:  PROzac  
 TAKE 1 TABLET EVERY DAY  
  
 nitrofurantoin (macrocrystal-monohydrate) 100 mg capsule Commonly known as:  MACROBID Take 1 Cap by mouth two (2) times a day for 7 days. omeprazole 40 mg capsule Commonly known as:  PRILOSEC  
TAKE 1 CAPSULE EVERY DAY Prescriptions Sent to Pharmacy Refills  
 fluconazole (DIFLUCAN) 150 mg tablet 0 Sig: Take 1 Tab by mouth daily for 1 day. FDA advises cautious prescribing of oral fluconazole in pregnancy. Class: Normal  
 Pharmacy: [address] [place] [address] Ph #: [phone] Route: Oral  
 nitrofurantoin, macrocrystal-monohydrate, (MACROBID) 100 mg capsule 0 Sig: Take 1 Cap by mouth two (2) times a day for 7 days. Class: Normal  
 Pharmacy: [address] [place] [address] Ph #: [phone] Route: Oral  
  
We Performed the Following AMB POC URINALYSIS DIP STICK AUTO W/O MICRO [31874 CPT(R)] CULTURE, URINE [id number] CPT(R)] NUSWAB VAGINITIS PLUS (VG+) WITH CANDIDA (SIX SPECIES) [PRINTER FRIE Custom] Follow-up Instructions Return if symptoms worsen or fail to improve. Patient Instructions Candidiasis: Care Instructions Your Care Instructions Candidiasis (say \"pmp-ynu-GU-uh-refugio\") is a yeast infection. Yeast normally lives in your body. But it can cause problems if your body's defenses don't work as they should. Some medicines can increase your chance of getting a yeast infection. These include antibiotics, steroids, and cancer drugs. And some diseases like AIDS and diabetes can make you more likely to get yeast infections. There are different types of yeast infections. Bailey Mcdanielh is a yeast infection in the mouth. It usually occurs in people with weak immune systems. It causes white patches inside the mouth and throat. Yeast infections of the skin usually occur in skin folds where the skin stays moist. They cause red, oozing patches on your skin. Babies can get these infections under the diaper. People who often wear gloves can get them on their hands. Many women get vaginal yeast infections. They are most common when women take antibiotics. These infections can cause the vagina to itch and burn. They also cause white discharge that looks like cottage cheese. In rare cases, yeast infects the blood. This can cause serious disease. This kind of infection is treated with medicine given through a needle into a vein (IV). After you start treatment, a yeast infection usually goes away quickly. But if your immune system is weak, the infection may come back. Tell your doctor if you get yeast infections often. Follow-up care is a key part of your treatment and safety. Be sure to make and go to all appointments, and call your doctor if you are having problems. It's also a good idea to know your test results and keep a list of the medicines you take. How can you care for yourself at home? · Take your medicines exactly as prescribed. Call your doctor if you think you are having a problem with your medicine. · Use antibiotics only as directed by your doctor. · Eat yogurt with live cultures. It has bacteria called lactobacillus. It may help prevent some types of yeast infections. · Keep your skin clean and dry. Put powder on moist places. · If you are using a cream or suppository to treat a vaginal yeast infection, don't use condoms or a diaphragm. Use a different type of birth control. · Eat a healthy diet and get regular exercise. This will help keep your immune system strong. When should you call for help? Watch closely for changes in your health, and be sure to contact your doctor if: 
? · You do not get better as expected. Where can you learn more? Go to http://daniela-josh.info/. Enter P152 in the search box to learn more about \"Candidiasis: Care Instructions. \" Current as of: October 13, 2016 Content Version: 11.4 © 9556-9208 Healthwise, [employer]. Care instructions adapted under license by [employer] (which disclaims liability or warranty for this information). If you have questions about a medical condition or this instruction, always ask your healthcare professional. [address] any warranty or liability for your use of this information. Introducing [facility]! Dear [place] Dance: Thank you for requesting a ROLI account. Our records indicate that you already have an active ROLI account. You can access your account anytime at [email or web address] Did you know that you can access your hospital and ER discharge instructions at any time in ROLI? You can also review all of your test results from your hospital stay or ER visit. Additional Information If you have questions, please visit the Frequently Asked Questions section of the ROLI website at [email or web address] Remember, ROLI is NOT to be used for urgent needs. For medical emergencies, dial 911. Now available from your iPhone and Android! Please provide this summary of care documentation to your next provider. Your primary care clinician is listed as [address] [address] [place] [address] [address]. If you have any questions after today's visit, please call [phone].

## 2018-04-05 ENCOUNTER — TELEPHONE (OUTPATIENT)
Dept: PRIMARY CARE CLINIC | Age: 73
End: 2018-04-05

## 2018-04-05 LAB — BACTERIA UR CULT: NORMAL

## 2018-04-05 NOTE — PROGRESS NOTES
Please call the patient and advise that her urine was negative for infection, she can stop taking the Macrobid, and the remaining results of vaginal swab should be completed in the next few days. We will contact her with that result as soon as it is available.

## 2018-04-06 ENCOUNTER — TELEPHONE (OUTPATIENT)
Dept: FAMILY MEDICINE CLINIC | Age: 73
End: 2018-04-06

## 2018-04-06 NOTE — TELEPHONE ENCOUNTER
Called and spoke with patient. Verified patient identifiers. Given lab results and orders per Андрей Escudero NP. Patient verbalized understanding.

## 2018-04-06 NOTE — TELEPHONE ENCOUNTER
----- Message from Emilia Do NP sent at 4/5/2018  6:25 PM EDT -----  Please call the patient and advise that her urine was negative for infection, she can stop taking the Macrobid, and the remaining results of vaginal swab should be completed in the next few days. We will contact her with that result as soon as it is available.

## 2018-04-07 LAB
A VAGINAE DNA VAG QL NAA+PROBE: NORMAL SCORE
BVAB2 DNA VAG QL NAA+PROBE: NORMAL SCORE
C ALBICANS DNA VAG QL NAA+PROBE: NEGATIVE
C GLABRATA DNA VAG QL NAA+PROBE: NEGATIVE
C KRUSEI DNA VAG QL NAA+PROBE: NEGATIVE
C LUSITANIAE DNA VAG QL NAA+PROBE: NEGATIVE
C PARAP DNA VAG QL NAA+PROBE: NEGATIVE
C TRACH RRNA SPEC QL NAA+PROBE: NEGATIVE
C TROPICLS DNA VAG QL NAA+PROBE: NEGATIVE
MEGA1 DNA VAG QL NAA+PROBE: NORMAL SCORE
N GONORRHOEA RRNA SPEC QL NAA+PROBE: NEGATIVE
T VAGINALIS RRNA SPEC QL NAA+PROBE: NEGATIVE

## 2018-04-10 NOTE — PROGRESS NOTES
Please call the patient and advise that all other tests were also negative, no vaginal yeast, bacteria, or other infection noted. Follow up with GYN if the condition continues.   Thank You

## 2018-04-11 ENCOUNTER — TELEPHONE (OUTPATIENT)
Dept: PRIMARY CARE CLINIC | Age: 73
End: 2018-04-11

## 2018-04-11 NOTE — TELEPHONE ENCOUNTER
Patient returned call. Verified patient identifiers. Patient given lab results and instructions per Jared Grove NP. Patient verbalized understanding.

## 2018-04-11 NOTE — TELEPHONE ENCOUNTER
----- Message from Cedric Guidry NP sent at 4/10/2018 11:07 AM EDT -----  Please call the patient and advise that all other tests were also negative, no vaginal yeast, bacteria, or other infection noted. Follow up with GYN if the condition continues.   Thank You

## 2018-04-11 NOTE — TELEPHONE ENCOUNTER
----- Message from Larry Israel NP sent at 4/10/2018 11:07 AM EDT -----  Please call the patient and advise that all other tests were also negative, no vaginal yeast, bacteria, or other infection noted. Follow up with GYN if the condition continues.   Thank You

## 2018-07-10 DIAGNOSIS — K52.9 IBD (INFLAMMATORY BOWEL DISEASE): ICD-10-CM

## 2018-07-10 RX ORDER — OMEPRAZOLE 40 MG/1
CAPSULE, DELAYED RELEASE ORAL
Qty: 90 CAP | Refills: 1 | OUTPATIENT
Start: 2018-07-10

## 2018-07-10 RX ORDER — FLUOXETINE 10 MG/1
TABLET ORAL
Qty: 90 TAB | Refills: 1 | OUTPATIENT
Start: 2018-07-10

## 2018-07-11 RX ORDER — OMEPRAZOLE 40 MG/1
CAPSULE, DELAYED RELEASE ORAL
Qty: 90 CAP | Refills: 1 | Status: SHIPPED | OUTPATIENT
Start: 2018-07-11 | End: 2019-04-16

## 2018-07-11 RX ORDER — FLUOXETINE 10 MG/1
TABLET ORAL
Qty: 90 TAB | Refills: 1 | Status: SHIPPED | OUTPATIENT
Start: 2018-07-11 | End: 2018-10-12 | Stop reason: SDUPTHER

## 2018-08-08 ENCOUNTER — DOCUMENTATION ONLY (OUTPATIENT)
Dept: INTERNAL MEDICINE CLINIC | Age: 73
End: 2018-08-08

## 2018-08-10 ENCOUNTER — DOCUMENTATION ONLY (OUTPATIENT)
Dept: INTERNAL MEDICINE CLINIC | Age: 73
End: 2018-08-10

## 2018-10-12 ENCOUNTER — OFFICE VISIT (OUTPATIENT)
Dept: INTERNAL MEDICINE CLINIC | Age: 73
End: 2018-10-12

## 2018-10-12 VITALS
SYSTOLIC BLOOD PRESSURE: 110 MMHG | RESPIRATION RATE: 12 BRPM | HEIGHT: 60 IN | OXYGEN SATURATION: 96 % | WEIGHT: 137 LBS | DIASTOLIC BLOOD PRESSURE: 64 MMHG | HEART RATE: 102 BPM | BODY MASS INDEX: 26.9 KG/M2 | TEMPERATURE: 98.1 F

## 2018-10-12 DIAGNOSIS — E66.3 OVERWEIGHT (BMI 25.0-29.9): ICD-10-CM

## 2018-10-12 DIAGNOSIS — K21.9 GASTROESOPHAGEAL REFLUX DISEASE WITHOUT ESOPHAGITIS: ICD-10-CM

## 2018-10-12 DIAGNOSIS — F33.42 RECURRENT MAJOR DEPRESSIVE DISORDER, IN FULL REMISSION (HCC): Primary | ICD-10-CM

## 2018-10-12 DIAGNOSIS — K51.00 ULCERATIVE PANCOLITIS WITHOUT COMPLICATION (HCC): ICD-10-CM

## 2018-10-12 PROBLEM — R93.1 AGATSTON CAC SCORE, <100: Status: RESOLVED | Noted: 2017-06-13 | Resolved: 2018-10-12

## 2018-10-12 PROBLEM — Z71.89 LIVING WILL, COUNSELING/DISCUSSION: Status: RESOLVED | Noted: 2017-05-19 | Resolved: 2018-10-12

## 2018-10-12 PROBLEM — Z78.9 LIVING WILL IN PLACE: Status: RESOLVED | Noted: 2017-05-19 | Resolved: 2018-10-12

## 2018-10-12 RX ORDER — MONTELUKAST SODIUM 10 MG/1
10 TABLET ORAL DAILY
COMMUNITY
End: 2018-10-17 | Stop reason: ALTCHOICE

## 2018-10-12 RX ORDER — FLUOXETINE 10 MG/1
TABLET ORAL
Qty: 90 TAB | Refills: 1 | Status: SHIPPED | OUTPATIENT
Start: 2018-10-12 | End: 2019-04-16

## 2018-10-12 RX ORDER — FLUTICASONE PROPIONATE 50 MCG
2 SPRAY, SUSPENSION (ML) NASAL
COMMUNITY
End: 2019-04-16

## 2018-10-12 RX ORDER — FLUOXETINE 10 MG/1
TABLET ORAL
Qty: 90 TAB | Refills: 1 | Status: SHIPPED | OUTPATIENT
Start: 2018-10-12 | End: 2018-10-12 | Stop reason: SDUPTHER

## 2018-10-12 NOTE — PROGRESS NOTES
Melisa Fogn is a 71y.o. year old female who is a new patient to me today (10/12/18). She was previous followed by Dr Brent Kendrick. She moved her appointment up to get medication refills. Assessment & Plan:  
Diagnoses and all orders for this visit: 1. Recurrent major depressive disorder, [facility]- new dx to me, chronic problem, her symptoms are well controlled, reviewed treatment options with her, reviewed life style changes to help improve mood, reviewed role of daily vs prn meds, continue current treatment.     
-     FLUoxetine (PROZAC) 10 mg tablet; TAKE 1 TABLET EVERY DAY 
 
2. Gastroesophageal reflux disease without esophagitis- new dx to me, chronic problem, well controlled, continue current treatment but did review trying to decrease from 40mg to 20mg when she needs refills 3. Ulcerative pancolitis without complication ([facility]- new dx to me, chronic problem, having intermittent symptoms, will defer to specialist. 
 
4. Overweight- new dx to me, chronic problem, stable and acceptable, I have reviewed/discussed the above normal BMI with the patient. I have recommended the following interventions: encourage exercise and lifestyle education regarding diet. Declined flu vaccines. Is on waiting list for shingles vaccine. She reports recent labs done by OBGYN, these have been requested. Follow-up Disposition: 
Return in about 6 months (around 4/12/2019) for FULL PHYSICAL - 30 minutes. Subjective:  
Claudia Cousin was seen today for [facility] [place] [facility] Mental Health Review Patient is seen for depression. She reports started on medications when diagnosed w/ UC. Has been on meds x 25 yrs. She reports will get some some anhedonia. PHQ-9 reviewed. Reports experiences the following side effects from the treatment: none. She has tried to stop medications in the past (taking every other day) but mood gets worse. GI Review She has a history of GERD. She has been on medications for 20+ yrs. She denies: abdominal bloating, heartburn, midepigastric pain and nausea. She has identified the following triggers: fried foods, spicy foods. Medical therapy currently involves Prilosec. The following sections were reviewed & updated as appropriate: PMH, PL, PSH, FH, and SH. Patient Active Problem List  
Diagnosis Code  HLD (hyperlipidemia) E78.5  Recurrent major depressive disorder ([facility] F33.9  Iron deficiency anemia D50.9  Ulcerative pancolitis without complication ([facility] D76.54  Chronic allergic rhinitis J30.9 Prior to Admission medications Medication Sig Start Date End Date Taking? Authorizing Provider  
montelukast (SINGULAIR) 10 mg tablet Take 10 mg by mouth daily. Yes Historical Provider  
omeprazole (PRILOSEC) 40 mg capsule TAKE 1 CAPSULE EVERY DAY 7/11/18  Yes Josias Campbell PA-C  
FLUoxetine (PROZAC) 10 mg tablet TAKE 1 TABLET EVERY DAY 7/11/18  Yes Josias Campbell PA-C  
fluticasone [facility] ALLERGY RELIEF) 50 mcg/actuation nasal spray 2 Sprays by Both Nostrils route daily as needed for Rhinitis. Historical Provider EPINEPHrine (EPIPEN) 0.3 mg/0.3 mL injection  3/28/18   Historical Provider  
mesalamine (CANASA) 1,000 mg suppository Insert  into rectum two (2) times a day. Historical Provider Allergies Allergen Reactions  Aspirin Other (comments) Has ulcerative proctitis  Codeine Other (comments) Headaches?  Hydrocodone Nausea and Vomiting  Lipitor [Atorvastatin] Myalgia  Pcn [Penicillins] Swelling Hives  Simvastatin Other (comments) Abdomina pain  Sulfa (Sulfonamide Antibiotics) Swelling  
  lips Review of Systems Constitutional: Negative for malaise/fatigue and weight loss.   
     She reports once/wk feeling hot, lasting for 15 minutes, resolves on its own. No obvious triggers. Has been present for 2 yrs. It is stable Respiratory: Negative for cough and shortness of breath. Cardiovascular: Negative for chest pain, palpitations and leg swelling. Gastrointestinal: Positive for blood in stool and constipation. Negative for abdominal pain, diarrhea, heartburn, nausea and vomiting. Musculoskeletal: Negative for joint pain and myalgias. Skin: Negative for rash. Neurological: Negative for tingling, sensory change, focal weakness and headaches. Psychiatric/Behavioral: Negative for depression. The patient is not nervous/anxious and does not have insomnia. Objective:  
Physical Exam  
Constitutional: She appears well-developed. No distress. HENT:  
Mouth/Throat: Mucous membranes are normal.  
Eyes: Conjunctivae and lids are normal. No scleral icterus. Neck: Neck supple. No thyromegaly present. Cardiovascular: Regular rhythm and normal heart sounds. No murmur heard. Pulmonary/Chest: Effort normal and breath sounds normal. She has no wheezes. She has no rales. Abdominal: Soft. Bowel sounds are normal. She exhibits no mass. There is no hepatosplenomegaly. There is no tenderness. Musculoskeletal: She exhibits no edema. Lymphadenopathy:  
  She has no cervical adenopathy. Skin: No rash noted. Psychiatric: She has a normal mood and affect. Her behavior is normal.  
  
 
 
Visit Vitals  /64  Pulse (!) 102  Temp 98.1 °F (36.7 °C) (Oral)  Resp 12  Ht 5' (1.524 m)  Wt 137 lb (62.1 kg)  SpO2 96%  BMI 26.76 kg/m2 Disclaimer: 
Advised her to call back or return to office if symptoms worsen/change/persist. 
Discussed expected course/resolution/complications of diagnosis in detail with patient. Medication risks/benefits/costs/interactions/alternatives discussed with patient. She was given an after visit summary which includes diagnoses, current medications, & vitals. She expressed understanding with the diagnosis and plan. Aspects of this note may have been generated using voice recognition software. Despite editing, there may be some syntax errors.   
 
 
Laz Manzo MD

## 2018-10-17 ENCOUNTER — OFFICE VISIT (OUTPATIENT)
Dept: CARDIOLOGY CLINIC | Age: 73
End: 2018-10-17

## 2018-10-17 VITALS
BODY MASS INDEX: 26.9 KG/M2 | HEIGHT: 60 IN | OXYGEN SATURATION: 98 % | HEART RATE: 87 BPM | SYSTOLIC BLOOD PRESSURE: 120 MMHG | WEIGHT: 137 LBS | RESPIRATION RATE: 18 BRPM | DIASTOLIC BLOOD PRESSURE: 70 MMHG

## 2018-10-17 DIAGNOSIS — R93.1 AGATSTON CAC SCORE, <100: ICD-10-CM

## 2018-10-17 DIAGNOSIS — E78.2 MIXED HYPERLIPIDEMIA: ICD-10-CM

## 2018-10-17 DIAGNOSIS — R00.2 HEART PALPITATIONS: Primary | ICD-10-CM

## 2018-10-17 NOTE — PROGRESS NOTES
HISTORY OF PRESENT ILLNESS  Elda Baumann is a 71 y.o. female     SUMMARY:   Problem List  Date Reviewed: 10/17/2018          Codes Class Noted    Gastroesophageal reflux disease without esophagitis ICD-10-CM: K21.9  ICD-9-CM: 530.81  10/12/2018        Chronic allergic rhinitis ICD-10-CM: J30.9  ICD-9-CM: 477.9  Unknown        Recurrent major depressive disorder ([facility] ICD-10-CM: F33.9  ICD-9-CM: 296.30  1/28/2013        Iron deficiency anemia ICD-10-CM: D50.9  ICD-9-CM: 280.9  1/28/2013        Ulcerative pancolitis without complication ([facility] GAJ-49-MF: K51.00  ICD-9-CM: 556.6  1/28/2013        HLD (hyperlipidemia) ICD-10-CM: E78.5  ICD-9-CM: 272.4  11/20/2012              Current Outpatient Medications on File Prior to Visit   Medication Sig    fluticasone (FLONASE ALLERGY RELIEF) 50 mcg/actuation nasal spray 2 Sprays by Both Nostrils route daily as needed for Rhinitis.  FLUoxetine (PROZAC) 10 mg tablet TAKE 1 TABLET EVERY DAY    omeprazole (PRILOSEC) 40 mg capsule TAKE 1 CAPSULE EVERY DAY    EPINEPHrine (EPIPEN) 0.3 mg/0.3 mL injection     mesalamine (CANASA) 1,000 mg suppository Insert  into rectum two (2) times a day. No current facility-administered medications on file prior to visit. CARDIOLOGY STUDIES TO DATE:  9/10 calcium score 45, 70th percentile  11/12 normal exercise cardiolyte stress test, lvef 79%  7/17 normal stress echo      Chief Complaint   Patient presents with    Abnormal Cardiac Test     HPI :  Ms. Denise Wolfe is doing great. She is still very, very active, but not exercising and other than occasional palpitations, which she now attributes to stress, she has not had any worrisome cardiac symptoms. She has not had her lipids checked for over a year, but is due for a physical in the near future.           CARDIAC ROS:   negative for chest pain, dyspnea, syncope, orthopnea, paroxysmal nocturnal dyspnea, exertional chest pressure/discomfort, claudication, lower extremity edema    Family History   Problem Relation Age of Onset    Heart Disease Mother         heart attack in her [de-identified]    Heart Disease Father         cabg late 57's    Cancer Sister         breast    Coronary Artery Disease Brother         s/p CABG    Arthritis-osteo Maternal Aunt     No Known Problems Daughter        Past Medical History:   Diagnosis Date    Allergic rhinitis     CAD (coronary artery disease)     by EBT    Depression     GERD (gastroesophageal reflux disease)     Iron deficiency anemia     Ulcerative colitis ([facility]        GENERAL ROS:  A comprehensive review of systems was negative except for that written in the HPI.     Visit Vitals  /70 (BP 1 Location: Left arm, BP Patient Position: Sitting)   Pulse 87   Resp 18   Ht 5' (1.524 m)   Wt 137 lb (62.1 kg)   SpO2 98%   BMI 26.76 kg/m²       Wt Readings from Last 3 Encounters:   10/17/18 137 lb (62.1 kg)   10/12/18 137 lb (62.1 kg)   04/04/18 139 lb (63 kg)            BP Readings from Last 3 Encounters:   10/17/18 120/70   10/12/18 110/64   04/04/18 131/81       PHYSICAL EXAM  General appearance: alert, cooperative, no distress, appears stated age  Neurologic: Alert and oriented X 3  Neck: supple, symmetrical, trachea midline, no adenopathy, no carotid bruit and no JVD  Lungs: clear to auscultation bilaterally  Heart: regular rate and rhythm, S1, S2 normal, no murmur, click, rub or gallop  Extremities: extremities normal, atraumatic, no cyanosis or edema    Lab Results   Component Value Date/Time    Cholesterol, total 230 (H) 05/19/2017 11:10 AM    Cholesterol, total 277 (H) 11/23/2015 11:37 AM    Cholesterol, total 283 (H) 10/06/2014 11:28 AM    Cholesterol, total 288 (H) 10/01/2013 11:44 AM    Cholesterol, total 207 (H) 04/03/2013 01:54 PM    HDL Cholesterol 71 05/19/2017 11:10 AM    HDL Cholesterol 81 11/23/2015 11:37 AM    HDL Cholesterol 78 10/06/2014 11:28 AM    HDL Cholesterol 75 10/01/2013 11:44 AM    HDL Cholesterol 73 04/03/2013 01:54 PM    LDL, calculated 133 (H) 05/19/2017 11:10 AM    LDL, calculated 177 (H) 11/23/2015 11:37 AM    LDL, calculated 181 (H) 10/06/2014 11:28 AM    LDL, calculated 184 (H) 10/01/2013 11:44 AM    LDL, calculated 109 (H) 04/03/2013 01:54 PM    Triglyceride 132 05/19/2017 11:10 AM    Triglyceride 94 11/23/2015 11:37 AM    Triglyceride 118 10/06/2014 11:28 AM    Triglyceride 143 10/01/2013 11:44 AM    Triglyceride 126 04/03/2013 01:54 PM     ASSESSMENT  Ms. Karyle Mellow is stable, asymptomatic and well compensated on a good medical regimen and needs no cardiac testing at this time. current treatment plan is effective, no change in therapy  lab results and schedule of future lab studies reviewed with patient  reviewed diet, exercise and weight control    Encounter Diagnoses   Name Primary?  Heart palpitations Yes    Mixed hyperlipidemia     Agatston CAC score, <100      No orders of the defined types were placed in this encounter. Follow-up Disposition:  Return in about 1 year (around 10/17/2019).     Yaneth Castillo MD  10/17/2018

## 2018-10-24 LAB
HBA1C MFR BLD HPLC: 5.7 %
LDL-C, EXTERNAL: 148

## 2019-04-16 ENCOUNTER — HOSPITAL ENCOUNTER (OUTPATIENT)
Dept: LAB | Age: 74
Discharge: HOME | End: 2019-04-16
Payer: MEDICARE

## 2019-04-16 ENCOUNTER — OFFICE VISIT (OUTPATIENT)
Dept: INTERNAL MEDICINE CLINIC | Age: 74
End: 2019-04-16

## 2019-04-16 VITALS
HEIGHT: 61 IN | TEMPERATURE: 97.7 F | SYSTOLIC BLOOD PRESSURE: 104 MMHG | RESPIRATION RATE: 14 BRPM | DIASTOLIC BLOOD PRESSURE: 68 MMHG | OXYGEN SATURATION: 97 % | HEART RATE: 86 BPM | WEIGHT: 134 LBS | BODY MASS INDEX: 25.3 KG/M2

## 2019-04-16 DIAGNOSIS — Z71.89 ACP (ADVANCE CARE PLANNING): ICD-10-CM

## 2019-04-16 DIAGNOSIS — Z23 ENCOUNTER FOR IMMUNIZATION: ICD-10-CM

## 2019-04-16 DIAGNOSIS — K51.00 ULCERATIVE PANCOLITIS WITHOUT COMPLICATION (HCC): ICD-10-CM

## 2019-04-16 DIAGNOSIS — Z00.00 MEDICARE ANNUAL WELLNESS VISIT, SUBSEQUENT: Primary | ICD-10-CM

## 2019-04-16 DIAGNOSIS — Z12.31 ENCOUNTER FOR SCREENING MAMMOGRAM FOR MALIGNANT NEOPLASM OF BREAST: ICD-10-CM

## 2019-04-16 DIAGNOSIS — F33.42 RECURRENT MAJOR DEPRESSIVE DISORDER, IN FULL REMISSION (HCC): ICD-10-CM

## 2019-04-16 DIAGNOSIS — M94.9 DISORDER OF BONE AND CARTILAGE: ICD-10-CM

## 2019-04-16 DIAGNOSIS — M89.9 DISORDER OF BONE AND CARTILAGE: ICD-10-CM

## 2019-04-16 DIAGNOSIS — Z13.39 SCREENING FOR ALCOHOLISM: ICD-10-CM

## 2019-04-16 DIAGNOSIS — Z13.31 SCREENING FOR DEPRESSION: ICD-10-CM

## 2019-04-16 DIAGNOSIS — K21.9 GASTROESOPHAGEAL REFLUX DISEASE WITHOUT ESOPHAGITIS: ICD-10-CM

## 2019-04-16 PROCEDURE — 36415 COLL VENOUS BLD VENIPUNCTURE: CPT

## 2019-04-16 PROCEDURE — 80053 COMPREHEN METABOLIC PANEL: CPT

## 2019-04-16 PROCEDURE — 85027 COMPLETE CBC AUTOMATED: CPT

## 2019-04-16 RX ORDER — BISMUTH SUBSALICYLATE 262 MG
1 TABLET,CHEWABLE ORAL DAILY
COMMUNITY
End: 2022-05-07

## 2019-04-16 NOTE — PROGRESS NOTES
Merari Ayers is a 68 y.o. female who was seen in clinic today (4/16/2019) for a full physical.     
 
Assessment & Plan:  
Diagnoses and all orders for this visit: 
 
1. Medicare annual wellness visit, subsequent 2. Encounter for immunization 3. ACP (advance care planning) -     FULL CODE 
 
4. Screening for alcoholism -     [place] ANNUAL ALCOHOL SCREEN 15 MIN 
 
5. Screening for depression 
-     [facility] 6. Encounter for screening mammogram for malignant neoplasm of breast 
-     ECHO MAMMO BI SCREENING INCL CAD; Future 7. Disorder of bone and cartilage -     DEXA BONE DENSITY STUDY AXIAL; Future 8. Ulcerative pancolitis without complication ([facility] [address]- symptoms stable, continue meds per specialist, patient reports no lab work done last year so will check labs below. -     METABOLIC PANEL, COMPREHENSIVE 
-     CBC W/O DIFF 9. Recurrent major depressive disorder, in full remission ([facility] [address]- well controlled off medications, will continue to monitor 10. Gastroesophageal reflux disease without esophagitis- well controlled off medications, will continue to monitor. After patient left labs reviewed from OBGYN from the fall '18. Will defer to OBGYN elevated lipids. Follow-up and Dispositions · Return in about 1 year (around 4/16/2020), or if symptoms worsen or fail to improve. 
  
    
------------------------------------------------------------------------------------------ Subjective: Annual Wellness Visit- Subsequent VisitEnd of Life Planning: This was discussed with her today and she has an advanced directive - a copy HAS NOT been provided. Reviewed DNR/DNI and patient is not interested. Depression Screen: 
3 most recent PHQ Screens 4/16/2019 Little interest or pleasure in doing things Not at all Feeling down, depressed, irritable, or hopeless Not at all Total Score PHQ 2 0 Trouble falling or staying asleep, or sleeping too much Not at all Feeling tired or having little energy Nearly every day Poor appetite, weight loss, or overeating Not at all Feeling bad about yourself - or that you are a failure or have let yourself or your family down Not at all Trouble concentrating on things such as school, work, reading, or watching TV Not at all Moving or speaking so slowly that other people could have noticed; or the opposite being so fidgety that others notice Not at all Thoughts of being better off dead, or hurting yourself in some way Not at all PHQ 9 Score 3 How difficult have these problems made it for you to do your work, take care of your home and get along with others Not difficult at all Fall Risk:  
Fall Risk Assessment, last 12 mths 4/16/2019 Able to walk? Yes Fall in past 12 months? No  
Fall with injury? -  
Number of falls in past 12 months - Fall Risk Score -  
 
 
Abuse Screen: 
Abuse Screening Questionnaire 4/16/2019 Do you ever feel afraid of your partner? Irina Hoang Are you in a relationship with someone who physically or mentally threatens you? Irina Hoang Is it safe for you to go home? Reji Sin Alcohol Risk Factor Screening: On any occasion during the past 3 months, have you had more than 3 drinks containing alcohol? No 
Do you average more than 7 drinks per week? No 
 
Hearing Loss: Hearing is good. Cognition Screen: 
Has your family/caregiver stated any concerns about your memory: no 
Cognition: appears appropriate for age attention/concentration and appears appropriate safety awareness Activities of Daily Living:   
Requires assistance with: no ADLs Home contains: handrails and grab bars Exercise: moderately active- just started walking Adult Nutrition Screen: No risk factors noted. Health Maintenance Daily Aspirin: no 
Bone Density: not UTD - order placed Glaucoma Screening: UTD Immunizations:  
 Influenza: declined. Tetanus: up to date. Shingles: declined. Pneumonia: declined. Cancer screening:  
 Cervical: reviewed guidelines, done by OBGYN. Breast: reviewed guidelines, order placed. Colon: reviewed guidelines, up to date. Patient Care Team: 
Reji Meade MD as PCP - General (Internal Medicine) Honey Carlos MD as Physician (Gastroenterology) Meggan Jha MD as Physician (Dermatology) Nicolle Hodge MD as Physician (Obstetrics & Gynecology) Shannon Hinojosa MD as Physician (Cardiology) Patti Coleman MD as Physician (Orthopedic Surgery) Ira Andrade MD (Dermatology) Aranza Alexis MD (Optometry) The following sections were reviewed & updated as appropriate: PMH, PSH, FH, and SH. Patient Active Problem List  
Diagnosis Code  HLD (hyperlipidemia) E78.5  Recurrent major depressive disorder ([facility] [address] F33.9  Iron deficiency anemia D50.9  [facility] O73.28  Chronic allergic rhinitis J30.9  Gastroesophageal reflux disease without esophagitis K21.9 Prior to Admission medications Medication Sig Start Date End Date Taking? Authorizing Provider  
multivitamin (ONE A DAY) tablet Take 1 Tab by mouth daily. Yes Provider, Historical  
EPINEPHrine (EPIPEN) 0.3 mg/0.3 mL injection  3/28/18   Provider, Historical  
mesalamine (CANASA) 1,000 mg suppository Insert  into rectum two (2) times a day. Provider, Historical  
  
 
 
Allergies Allergen Reactions  Aspirin Other (comments) Has ulcerative proctitis  Codeine Other (comments) Headaches?  Hydrocodone Nausea and Vomiting  Lipitor [Atorvastatin] Myalgia  Pcn [Penicillins] Swelling Hives  Simvastatin Other (comments) Abdomina pain  Sulfa (Sulfonamide Antibiotics) Swelling  
  lips Review of Systems Constitutional: Negative for malaise/fatigue and weight loss. Respiratory: Negative for cough and shortness of breath. Cardiovascular: Negative for chest pain, palpitations and leg swelling. Gastrointestinal: Positive for constipation. Negative for abdominal pain, blood in stool, diarrhea, heartburn, nausea and vomiting. Genitourinary: Negative for frequency. Musculoskeletal: Negative for joint pain and myalgias. Skin: Negative for rash. Neurological: Negative for tingling, sensory change, focal weakness and headaches. Psychiatric/Behavioral: Negative for depression. The patient is not nervous/anxious and does not have insomnia. Objective:  
Physical Exam  
Constitutional: She appears well-developed. No distress. HENT:  
Mouth/Throat: Mucous membranes are normal.  
Eyes: Conjunctivae and lids are normal. No scleral icterus. Neck: Neck supple. No thyromegaly present. Cardiovascular: Regular rhythm and normal heart sounds. No murmur heard. Pulmonary/Chest: Effort normal and breath sounds normal. She has no wheezes. She has no rales. Abdominal: Soft. Bowel sounds are normal. She exhibits no mass. There is no hepatosplenomegaly. There is generalized tenderness. There is no rigidity and no guarding. Musculoskeletal: She exhibits no edema. Lymphadenopathy:  
  She has no cervical adenopathy. Skin: No rash noted. Psychiatric: She has a normal mood and affect. Her behavior is normal.  
  
 
 
Visit Vitals /68 Pulse 86 Temp 97.7 °F (36.5 °C) (Oral) Resp 14 Ht 5' 0.6\" (1.539 m) Wt 134 lb (60.8 kg) SpO2 97% BMI 25.65 kg/m² Advised her to call back or return to office if symptoms worsen/change/persist. 
Discussed expected course/resolution/complications of diagnosis in detail with patient. Medication risks/benefits/costs/interactions/alternatives discussed with patient. She was given an after visit summary which includes diagnoses, current medications, & vitals. She expressed understanding with the diagnosis and plan. Aspects of this note may have been generated using voice recognition software. Despite editing, there may be some syntax errors.   
 
 
Josey Melendez MD

## 2019-04-16 NOTE — PATIENT INSTRUCTIONS
Medicare Wellness Visit, Female The best way to live healthy is to have a lifestyle where you eat a well-balanced diet, exercise regularly, limit alcohol use, and quit all forms of tobacco/nicotine, if applicable. Regular preventive services are another way to keep healthy. Preventive services (vaccines, screening tests, monitoring & exams) can help personalize your care plan, which helps you manage your own care. Screening tests can find health problems at the earliest stages, when they are easiest to treat. [facility] follows the current, evidence-based guidelines published by the [facility] ([facility]) when recommending preventive services for our patients. Because we follow these guidelines, sometimes recommendations change over time as research supports it. (For example, mammograms used to be recommended annually. Even though Medicare will still pay for an annual mammogram, the newer guidelines recommend a mammogram every two years for women of average risk.) Of course, you and your doctor may decide to screen more often for some diseases, based on your risk and your health status. Preventive services for you include: - Medicare offers their members a free annual wellness visit, which is time for you and your primary care provider to discuss and plan for your preventive service needs. Take advantage of this benefit every year! 
-All adults over the age of 72 should receive the recommended pneumonia vaccines. Current USPSTF guidelines recommend a series of two vaccines for the best pneumonia protection.  
-All adults should have a flu vaccine yearly and a tetanus vaccine every 10 years. All adults age 61 and older should receive a shingles vaccine once in their lifetime.   
-A bone mass density test is recommended when a woman turns 65 to screen for osteoporosis. This test is only recommended one time, as a screening. Some providers will use this same test as a disease monitoring tool if you already have osteoporosis. -All adults age 38-68 who are overweight should have a diabetes screening test once every three years.  
-Other screening tests and preventive services for persons with diabetes include: an eye exam to screen for diabetic retinopathy, a kidney function test, a foot exam, and stricter control over your cholesterol.  
-Cardiovascular screening for adults with routine risk involves an electrocardiogram (ECG) at intervals determined by your doctor.  
-Colorectal cancer screenings should be done for adults age 54-65 with no increased risk factors for colorectal cancer. There are a number of acceptable methods of screening for this type of cancer. Each test has its own benefits and drawbacks. Discuss with your doctor what is most appropriate for you during your annual wellness visit. The different tests include: colonoscopy (considered the best screening method), a fecal occult blood test, a fecal DNA test, and sigmoidoscopy. -Breast cancer screenings are recommended every other year for women of normal risk, age 54-69. 
-Cervical cancer screenings for women over age 72 are only recommended with certain risk factors.  
-All adults born between [facility] should be screened once for Hepatitis C. Here is a list of your current Health Maintenance items (your personalized list of preventive services) with a due date: 
Health Maintenance Due Topic Date Due  Bone Mineral Density   12/19/2011  Pneumococcal Vaccine (1 of 2 - PCV13) 12/19/2011  Mammogram  12/02/2017 [place] Annual Well Visit  05/20/2018 [address] What is a living will? A living will is a legal form you use to write down the kind of care you want at the end of your life. It is used by the health professionals who will treat you if you aren't able to decide for yourself. If you put your wishes in writing, your loved ones and others will know what kind of care you want. They won't need to guess. This can ease your mind and be helpful to others. A living will is not the same as an estate or property will. An estate will explains what you want to happen with your money and property after you die. Is a living will a legal document? A living will is a legal document. Each state has its own laws about living velez. If you move to another state, make sure that your living will is legal in the state where you now live. Or you might use a universal form that has been approved by many states. This kind of form can sometimes be completed and stored online. Your electronic copy will then be available wherever you have a connection to the Internet. In most cases, doctors will respect your wishes even if you have a form from a different state. · You don't need an  to complete a living will. But legal advice can be helpful if your state's laws are unclear, your health history is complicated, or your family can't agree on what should be in your living will. · You can change your living will at any time. Some people find that their wishes about end-of-life care change as their health changes. · In addition to making a living will, think about completing a medical power of  form. This form lets you name the person you want to make end-of-life treatment decisions for you (your \"health care agent\") if you're not able to. Many hospitals and nursing homes will give you the forms you need to complete a living will and a medical power of . · Your living will is used only if you can't make or communicate decisions for yourself anymore. If you become able to make decisions again, you can accept or refuse any treatment, no matter what you wrote in your living will. · Your state may offer an online registry.  This is a place where you can store your living will online so the doctors and nurses who need to treat you can find it right away. What should you think about when creating a living will? Talk about your end-of-life wishes with your family members and your doctor. Let them know what you want. That way the people making decisions for you won't be surprised by your choices. Think about these questions as you make your living will: · Do you know enough about life support methods that might be used? If not, talk to your doctor so you know what might be done if you can't breathe on your own, your heart stops, or you're unable to swallow. · What things would you still want to be able to do after you receive life-support methods? Would you want to be able to walk? To speak? To eat on your own? To live without the help of machines? · If you have a choice, where do you want to be cared for? In your home? At a hospital or nursing home? · Do you want certain Restorationist practices performed if you become very ill? · If you have a choice at the end of your life, where would you prefer to die? At home? In a hospital or nursing home? Somewhere else? · Would you prefer to be buried or cremated? · Do you want your organs to be donated after you die? What should you do with your living will? · Make sure that your family members and your health care agent have copies of your living will. · Give your doctor a copy of your living will to keep in your medical record. If you have more than one doctor, make sure that each one has a copy. · You may want to put a copy of your living will where it can be easily found. Where can you learn more? Go to http://daniela-josh.info/. Enter Z691 in the search box to learn more about \"Learning About Living Perroy. \" Current as of: August 8, 2016 Content Version: 11.3 © 3578-5534 [employer].  Care instructions adapted under license by [address] (which disclaims liability or warranty for this information). If you have questions about a medical condition or this instruction, always ask your healthcare professional. [address] any warranty or liability for your use of this information.

## 2019-04-16 NOTE — ACP (ADVANCE CARE PLANNING)
Advance Care Planning (ACP) Provider Note - Comprehensive     Date of ACP Conversation: 04/16/19  Persons included in Conversation:  patient  Length of ACP Conversation in minutes: <16 minutes (Non-Billable)    Authorized Decision Maker (if patient is incapable of making informed decisions):   Healthcare Agent/Medical Power of  under Advance Directive      She has an advanced directive - a copy HAS NOT been provided. Reviewed DNR/DNI and patient is not interested. General ACP for ALL Patients with Decision Making Capacity:  Importance of advance care planning, including choosing a healthcare agent to communicate patient's healthcare decisions if patient lost the ability to make decisions, such as after a sudden illness or accident  Understanding of the healthcare agent role was assessed and information provided  Opportunity offered to explore how cultural, Yarsanism, spiritual, or personal beliefs would affect decisions for future care  Exploration of values, goals, and preferences if recovery is not expected, even with continued medical treatment in the event of: Imminent death or severe, permanent brain injury    For Serious or Chronic Illness:  Understanding of CPR, goals and expected outcomes, benefits and burdens discussed.   Understanding of medical condition  Information on CPR success rates provided (e.g. for CPR in hospital, survival to d/c at two weeks is 22%, for chronically ill or elderly/frail survival is less than 3%)    Interventions Provided:  Recommended communicating the plan and making copies for the healthcare agent, personal physician, and others as appropriate (e.g., health system)  Recommended review of completed ACP document annually or upon change in health status

## 2019-04-16 NOTE — PROGRESS NOTES
Annual wellness. Not sure where she had pneumonia shots, thinks she has had one. Will try to find AMD, if not will prepare one and bring to office.

## 2019-04-17 LAB
ALBUMIN SERPL-MCNC: 4.4 G/DL (ref 3.5–4.8)
ALBUMIN/GLOB SERPL: 1.7 {RATIO} (ref 1.2–2.2)
ALP SERPL-CCNC: 62 IU/L (ref 39–117)
ALT SERPL-CCNC: 19 IU/L (ref 0–32)
AST SERPL-CCNC: 19 IU/L (ref 0–40)
BILIRUB SERPL-MCNC: 0.3 MG/DL (ref 0–1.2)
BUN SERPL-MCNC: 14 MG/DL (ref 8–27)
BUN/CREAT SERPL: 18 (ref 12–28)
CALCIUM SERPL-MCNC: 10.2 MG/DL (ref 8.7–10.3)
CHLORIDE SERPL-SCNC: 101 MMOL/L (ref 96–106)
CO2 SERPL-SCNC: 25 MMOL/L (ref 20–29)
CREAT SERPL-MCNC: 0.78 MG/DL (ref 0.57–1)
ERYTHROCYTE [DISTWIDTH] IN BLOOD BY AUTOMATED COUNT: 14.2 % (ref 12.3–15.4)
GLOBULIN SER CALC-MCNC: 2.6 G/DL (ref 1.5–4.5)
GLUCOSE SERPL-MCNC: 91 MG/DL (ref 65–99)
HCT VFR BLD AUTO: 44.8 % (ref 34–46.6)
HGB BLD-MCNC: 15 G/DL (ref 11.1–15.9)
MCH RBC QN AUTO: 29.6 PG (ref 26.6–33)
MCHC RBC AUTO-ENTMCNC: 33.5 G/DL (ref 31.5–35.7)
MCV RBC AUTO: 88 FL (ref 79–97)
PLATELET # BLD AUTO: 296 X10E3/UL (ref 150–379)
POTASSIUM SERPL-SCNC: 5.1 MMOL/L (ref 3.5–5.2)
PROT SERPL-MCNC: 7 G/DL (ref 6–8.5)
RBC # BLD AUTO: 5.07 X10E6/UL (ref 3.77–5.28)
SODIUM SERPL-SCNC: 142 MMOL/L (ref 134–144)
WBC # BLD AUTO: 6.1 X10E3/UL (ref 3.4–10.8)

## 2019-10-25 ENCOUNTER — OFFICE VISIT (OUTPATIENT)
Dept: CARDIOLOGY CLINIC | Age: 74
End: 2019-10-25

## 2019-10-25 VITALS
HEART RATE: 89 BPM | HEIGHT: 61 IN | WEIGHT: 136 LBS | SYSTOLIC BLOOD PRESSURE: 130 MMHG | BODY MASS INDEX: 25.68 KG/M2 | DIASTOLIC BLOOD PRESSURE: 82 MMHG | OXYGEN SATURATION: 98 % | RESPIRATION RATE: 16 BRPM

## 2019-10-25 DIAGNOSIS — R00.2 HEART PALPITATIONS: Primary | ICD-10-CM

## 2019-10-25 DIAGNOSIS — R93.1 AGATSTON CAC SCORE, <100: ICD-10-CM

## 2019-10-25 DIAGNOSIS — E78.2 MIXED HYPERLIPIDEMIA: ICD-10-CM

## 2019-10-25 RX ORDER — CLOBETASOL PROPIONATE 0.5 MG/G
EMULSION TOPICAL
COMMUNITY
Start: 2018-04-25 | End: 2021-01-28 | Stop reason: ALTCHOICE

## 2019-10-25 RX ORDER — OMEPRAZOLE 40 MG/1
CAPSULE, DELAYED RELEASE ORAL
COMMUNITY
Start: 2019-08-12

## 2019-10-25 NOTE — PROGRESS NOTES
HISTORY OF PRESENT ILLNESS  Hollie Rodriguez is a 68 y.o. female     SUMMARY:   Problem List  Date Reviewed: 10/24/2019          Codes Class Noted    Gastroesophageal reflux disease without esophagitis ICD-10-CM: K21.9  ICD-9-CM: 530.81  10/12/2018        Chronic allergic rhinitis ICD-10-CM: J30.9  ICD-9-CM: 477.9  Unknown        Recurrent major depressive disorder ([facility] ICD-10-CM: F33.9  ICD-9-CM: 296.30  1/28/2013        Iron deficiency anemia ICD-10-CM: D50.9  ICD-9-CM: 280.9  1/28/2013        Ulcerative pancolitis without complication ([facility] KCI-95-EV: K51.00  ICD-9-CM: 556.6  1/28/2013        HLD (hyperlipidemia) ICD-10-CM: E78.5  ICD-9-CM: 272.4  11/20/2012              Current Outpatient Medications on File Prior to Visit   Medication Sig    omeprazole (PRILOSEC) 40 mg capsule     clobetasol-emollient (TEMOVATE-E) 0.05 % topical cream clobetasol-emollient 0.05 % topical cream   to affected area bid    multivitamin (ONE A DAY) tablet Take 1 Tab by mouth daily.  EPINEPHrine (EPIPEN) 0.3 mg/0.3 mL injection     mesalamine (CANASA) 1,000 mg suppository Insert  into rectum two (2) times a day. No current facility-administered medications on file prior to visit. CARDIOLOGY STUDIES TO DATE:  9/10 calcium score 45, 70th percentile  11/12 normal stress myoview, lvef 79%  7/19 normal stress echo    Chief Complaint   Patient presents with    Cholesterol Problem     HPI :  Ms. Olya Moreno is doing great. She is active, but not exercising as much as she should. Her weight is stable. Blood pressure is good. The only time she has palpitations is when she gets anxious or upset.         CARDIAC ROS:   negative for chest pain, dyspnea, syncope, orthopnea, paroxysmal nocturnal dyspnea, exertional chest pressure/discomfort, claudication, lower extremity edema    Family History   Problem Relation Age of Onset    Coronary Artery Disease Mother         heart attack in her [de-identified]    Coronary Artery Disease Father cabg late 63's    Other Father         radiation exposure   Jevon Gan Sister     Breast Cancer Sister     Coronary Artery Disease Brother         s/p CABG    No Known Problems Daughter     Arthritis-osteo Brother        Past Medical History:   Diagnosis Date    Allergic rhinitis     CAD (coronary artery disease)     by EBT    Depression     GERD (gastroesophageal reflux disease)     Iron deficiency anemia     Ulcerative colitis ([facility]        GENERAL ROS:  A comprehensive review of systems was negative except for that written in the HPI.     Visit Vitals  /82 (BP 1 Location: Left arm, BP Patient Position: Sitting)   Pulse 89   Resp 16   Ht 5' 0.6\" (1.539 m)   Wt 136 lb (61.7 kg)   SpO2 98%   BMI 26.04 kg/m²       Wt Readings from Last 3 Encounters:   10/25/19 136 lb (61.7 kg)   04/16/19 134 lb (60.8 kg)   10/17/18 137 lb (62.1 kg)            BP Readings from Last 3 Encounters:   10/25/19 130/82   04/16/19 104/68   10/17/18 120/70       PHYSICAL EXAM  General appearance: alert, cooperative, no distress, appears stated age  Neurologic: Alert and oriented X 3  Neck: supple, symmetrical, trachea midline, no adenopathy, no carotid bruit and no JVD  Lungs: clear to auscultation bilaterally  Heart: regular rate and rhythm, S1, S2 normal, no murmur, click, rub or gallop  Extremities: extremities normal, atraumatic, no cyanosis or edema    Lab Results   Component Value Date/Time    Cholesterol, total 230 (H) 05/19/2017 11:10 AM    Cholesterol, total 277 (H) 11/23/2015 11:37 AM    Cholesterol, total 283 (H) 10/06/2014 11:28 AM    Cholesterol, total 288 (H) 10/01/2013 11:44 AM    Cholesterol, total 207 (H) 04/03/2013 01:54 PM    HDL Cholesterol 71 05/19/2017 11:10 AM    HDL Cholesterol 81 11/23/2015 11:37 AM    HDL Cholesterol 78 10/06/2014 11:28 AM    HDL Cholesterol 75 10/01/2013 11:44 AM    HDL Cholesterol 73 04/03/2013 01:54 PM    LDL, calculated 133 (H) 05/19/2017 11:10 AM    LDL, calculated 177 (H) 11/23/2015 11:37 AM    LDL, calculated 181 (H) 10/06/2014 11:28 AM    LDL, calculated 184 (H) 10/01/2013 11:44 AM    LDL, calculated 109 (H) 04/03/2013 01:54 PM    Triglyceride 132 05/19/2017 11:10 AM    Triglyceride 94 11/23/2015 11:37 AM    Triglyceride 118 10/06/2014 11:28 AM    Triglyceride 143 10/01/2013 11:44 AM    Triglyceride 126 04/03/2013 01:54 PM     ASSESSMENT  Ms. Cinthia Jacobson is stable and minimally symptomatic from a cardiac perspective and needs no further cardiac testing at this time. She will return if she needs. current treatment plan is effective, no change in therapy  lab results and schedule of future lab studies reviewed with patient  reviewed diet, exercise and weight control    Encounter Diagnoses   Name Primary?  Heart palpitations Yes    Mixed hyperlipidemia     Agatston CAC score, <100      Orders Placed This Encounter    omeprazole (PRILOSEC) 40 mg capsule    clobetasol-emollient (TEMOVATE-E) 0.05 % topical cream       Follow-up and Dispositions    · Return if symptoms worsen or fail to improve.          John Dominguez MD  10/25/2019

## 2020-04-27 ENCOUNTER — VIRTUAL VISIT (OUTPATIENT)
Dept: INTERNAL MEDICINE CLINIC | Age: 75
End: 2020-04-27

## 2020-04-27 DIAGNOSIS — Z86.19 HISTORY OF SHINGLES: ICD-10-CM

## 2020-04-27 DIAGNOSIS — R21 RASH: Primary | ICD-10-CM

## 2020-04-27 DIAGNOSIS — F41.8 SITUATIONAL ANXIETY: ICD-10-CM

## 2020-04-27 RX ORDER — VALACYCLOVIR HYDROCHLORIDE 1 G/1
1000 TABLET, FILM COATED ORAL 3 TIMES DAILY
Qty: 21 TAB | Refills: 0 | Status: SHIPPED | OUTPATIENT
Start: 2020-04-27 | End: 2021-01-28 | Stop reason: ALTCHOICE

## 2020-04-27 RX ORDER — MINERAL OIL
180 ENEMA (ML) RECTAL
COMMUNITY

## 2020-04-27 RX ORDER — ALPRAZOLAM 0.25 MG/1
TABLET ORAL
Qty: 10 TAB | Refills: 0 | Status: SHIPPED | OUTPATIENT
Start: 2020-04-27 | End: 2021-01-28 | Stop reason: ALTCHOICE

## 2020-04-27 NOTE — PATIENT INSTRUCTIONS
If you decide to take the Valacyclovir, please let us know Xanax/Alprazolam can cause drowsiness, so be careful with its use; do not take this regularly

## 2020-04-27 NOTE — PROGRESS NOTES
Jose Antonio Reyez is a 69 y.o. female who was seen by synchronous (real-time) audio-video technology on 4/27/2020. She confirmed that, for purposes of billing, this is a virtual visit with her provider for which we will submit a claim for reimbursement to her insurance company. She is aware that she will be responsible for any copays, coinsurance amounts or other amounts not covered by her insurance company. Do you accept - YES    This visit was completed was completed virtually using [employer]      Assessment & Plan:   Diagnoses and all orders for this visit:    1. Rash  -     valACYclovir (VALTREX) 1 gram tablet; Take 1 Tab by mouth three (3) times daily. Indications: shingles    2. History of shingles  -     valACYclovir (VALTREX) 1 gram tablet; Take 1 Tab by mouth three (3) times daily. Indications: shingles    3. Situational anxiety  -     ALPRAZolam (XANAX) 0.25 mg tablet; 1/2 to 1 tab every 12 hours as needed for anxiety. CAUTION: may cause drowsiness          Time-based coding, delete if not needed: I spent at least 15 minutes with this established patient, and >50% of the time was spent counseling and/or coordinating care regarding discussed judicious use of Xanax; discussed Shingles - appearance, etc.; if she decides to take the Valacyclovir, she will let us know  Subjective:   Jose Antonio Reyez was seen for Rash      68 yo female with rash starting on RUE/forearm 2 days ago. She has had Shingles in the past, and has been under a lot of stress (Pandemic quarantine as well as her  having to go to [facility] to be with his dying mother). Yesterday, she also noticed another spot of this rash on R lateral abd at the waist, and today her OD at inner corner is irritated. When she had Shingles 9 years ago, it was in several different areas of her body. She has not been out in her yard doing yard work.   These areas itch rather than burn, and she has seen some relief from using topical Calamine lotion and taking Benedryl at night. She feels quite anxious about being alone, and is having difficulty sleeping. She took some Xanax a number of years ago, and wonders if she could have some on hand. Plan: Will rx #10 Xanax - discussed possible side effects including drowsiness; discouraged regular use ([facility] has no listing of controlled substance rx for this patient)   Valacyclovir - she will take this only if rash progresses (discussed typical appearance of Herpetiform rash) - if this does occur, she will let us know (she can't get her My Chart to work)      Prior to Admission medications    Medication Sig Start Date End Date Taking? Authorizing Provider   omeprazole (PRILOSEC) 40 mg capsule  8/12/19  Yes Provider, Historical   clobetasol-emollient (TEMOVATE-E) 0.05 % topical cream clobetasol-emollient 0.05 % topical cream   to affected area bid 4/25/18  Yes Provider, Historical   multivitamin (ONE A DAY) tablet Take 1 Tab by mouth daily. Yes Provider, Historical   mesalamine (CANASA) 1,000 mg suppository Insert  into rectum two (2) times a day. Yes Provider, Historical   EPINEPHrine (EPIPEN) 0.3 mg/0.3 mL injection  3/28/18   Provider, Historical       Allergies   Allergen Reactions    Aspirin Other (comments)     Has ulcerative proctitis     Codeine Other (comments)     Headaches?      Hydrocodone Nausea and Vomiting    Lipitor [Atorvastatin] Myalgia    Pcn [Penicillins] Swelling     Hives      Simvastatin Other (comments)     Abdomina pain    Sulfa (Sulfonamide Antibiotics) Swelling     lips       Patient Active Problem List   Diagnosis Code    HLD (hyperlipidemia) E78.5    Recurrent major depressive disorder (HCC) F33.9    Iron deficiency anemia D50.9    Ulcerative pancolitis without complication (HCC) E19.38    Chronic allergic rhinitis J30.9    Gastroesophageal reflux disease without esophagitis K21.9          ROS - per HPI      Objective:     General: alert, cooperative, mild distress   Mental status: normal mood, behavior, speech, dress, motor activity, and thought processes, anxious   Eyes: inner canthus OD - no redness or drainage; skin slightly dark; conjunctiva-clear, EOM intact, normal sclera   Mouth:    Neck:    Resp: normal effort and no respiratory distress   Neuro:    Musculoskeletal:    Skin: R forearm - 2 localized elevations w/ surrounding redness - not vesicular; waist-line area - one lesion similar to those on RUE   Psychiatric: anxious, normal affect         Due to this being a TeleHealth evaluation, many elements of the physical examination are unable to be assessed. Lilliam Mata is a 69 y.o. female being evaluated by a Virtual Visit (video visit) encounter to address concerns as mentioned above. A caregiver was present when appropriate. Due to this being a TeleHealth encounter (During XCQAA-94 public health emergency), evaluation of the following organ systems was limited: Vitals/Constitutional/EENT/Resp/CV/GI//MS/Neuro/Skin/Heme-Lymph-Imm. Pursuant to the emergency declaration under the [address] [place] [address] and the [employer] Act, this Virtual Visit was conducted with patient's (and/or legal guardian's) consent, to reduce the risk of exposure to COVID-19 and provide necessary medical care. Services were provided through a video synchronous discussion virtually to substitute for in-person clinic visit. We discussed the expected course, resolution and complications of the diagnosis(es) in detail. Medication risks, benefits, costs, interactions, and alternatives were discussed as indicated. I advised her to contact the office if her condition worsens, changes or fails to improve as anticipated. She expressed understanding with the diagnosis(es) and plan.      Claudia Steven NP

## 2021-01-28 ENCOUNTER — TELEPHONE (OUTPATIENT)
Dept: CARDIOLOGY CLINIC | Age: 76
End: 2021-01-28

## 2021-01-28 ENCOUNTER — OFFICE VISIT (OUTPATIENT)
Dept: CARDIOLOGY CLINIC | Age: 76
End: 2021-01-28
Payer: MEDICARE

## 2021-01-28 VITALS
HEART RATE: 77 BPM | OXYGEN SATURATION: 96 % | RESPIRATION RATE: 16 BRPM | SYSTOLIC BLOOD PRESSURE: 116 MMHG | WEIGHT: 134 LBS | HEIGHT: 61 IN | BODY MASS INDEX: 25.3 KG/M2 | DIASTOLIC BLOOD PRESSURE: 68 MMHG

## 2021-01-28 DIAGNOSIS — R93.1 AGATSTON CAC SCORE, <100: Primary | ICD-10-CM

## 2021-01-28 DIAGNOSIS — E78.2 MIXED HYPERLIPIDEMIA: ICD-10-CM

## 2021-01-28 DIAGNOSIS — R00.2 HEART PALPITATIONS: ICD-10-CM

## 2021-01-28 DIAGNOSIS — R00.0 TACHYCARDIA: ICD-10-CM

## 2021-01-28 PROCEDURE — G9717 DOC PT DX DEP/BP F/U NT REQ: HCPCS | Performed by: SPECIALIST

## 2021-01-28 PROCEDURE — G8399 PT W/DXA RESULTS DOCUMENT: HCPCS | Performed by: SPECIALIST

## 2021-01-28 PROCEDURE — G8536 NO DOC ELDER MAL SCRN: HCPCS | Performed by: SPECIALIST

## 2021-01-28 PROCEDURE — 1101F PT FALLS ASSESS-DOCD LE1/YR: CPT | Performed by: SPECIALIST

## 2021-01-28 PROCEDURE — 3017F COLORECTAL CA SCREEN DOC REV: CPT | Performed by: SPECIALIST

## 2021-01-28 PROCEDURE — G0463 HOSPITAL OUTPT CLINIC VISIT: HCPCS | Performed by: SPECIALIST

## 2021-01-28 PROCEDURE — G8419 CALC BMI OUT NRM PARAM NOF/U: HCPCS | Performed by: SPECIALIST

## 2021-01-28 PROCEDURE — 1090F PRES/ABSN URINE INCON ASSESS: CPT | Performed by: SPECIALIST

## 2021-01-28 PROCEDURE — G8427 DOCREV CUR MEDS BY ELIG CLIN: HCPCS | Performed by: SPECIALIST

## 2021-01-28 PROCEDURE — 99213 OFFICE O/P EST LOW 20 MIN: CPT | Performed by: SPECIALIST

## 2021-01-28 NOTE — PROGRESS NOTES
HISTORY OF PRESENT ILLNESS  Carmelita Boxer is a 77 y.o. female     SUMMARY:   Problem List  Date Reviewed: 1/28/2021          Codes Class Noted    Gastroesophageal reflux disease without esophagitis ICD-10-CM: K21.9  ICD-9-CM: 530.81  10/12/2018        Chronic allergic rhinitis ICD-10-CM: J30.9  ICD-9-CM: 477.9  Unknown        Recurrent major depressive disorder ([facility] ICD-10-CM: F33.9  ICD-9-CM: 296.30  1/28/2013        Iron deficiency anemia ICD-10-CM: D50.9  ICD-9-CM: 280.9  1/28/2013        Ulcerative pancolitis without complication ([facility] [id number]: K51.00  ICD-9-CM: 556.6  1/28/2013        HLD (hyperlipidemia) ICD-10-CM: E78.5  ICD-9-CM: 272.4  11/20/2012              Current Outpatient Medications on File Prior to Visit   Medication Sig    fexofenadine (ALLEGRA) 180 mg tablet Take 180 mg by mouth daily as needed for Allergies.  omeprazole (PRILOSEC) 40 mg capsule     multivitamin (ONE A DAY) tablet Take 1 Tab by mouth daily.  EPINEPHrine (EPIPEN) 0.3 mg/0.3 mL injection     mesalamine (CANASA) 1,000 mg suppository Insert  into rectum two (2) times a day. No current facility-administered medications on file prior to visit. CARDIOLOGY STUDIES TO DATE:  9/10 calcium score 45, 70th percentile   11/12 normal stress myoview, lvef 79%   7/17 normal stress echo    Chief Complaint   Patient presents with    Follow-up     HPI :  She is really stressed out between the results of the election of the virus of the inability to go to [employer] and so forth and for the last couple of months she is felt like her heart races for 5 times throughout the day and in association with this she has intermittent chest burning but no other symptoms. She is gotten out of her yoga and walking program and she is not sleeping well at night. She is due for her annual wellness exam next month.   She is no longer on any medications for anxiety or depression  CARDIAC ROS:   negative for dyspnea, syncope, orthopnea, paroxysmal nocturnal dyspnea, exertional chest pressure/discomfort, claudication, lower extremity edema    Family History   Problem Relation Age of Onset    Coronary Artery Disease Mother         heart attack in her [de-identified]    Coronary Artery Disease Father         cabg late 57's    Other Father         radiation exposure   Joce Myrick Sister     Breast Cancer Sister     Coronary Artery Disease Brother         s/p CABG    No Known Problems Daughter     Arthritis-osteo Brother        Past Medical History:   Diagnosis Date    Allergic rhinitis     CAD (coronary artery disease)     by EBT    Depression     GERD (gastroesophageal reflux disease)     History of shingles 2011    Iron deficiency anemia     Ulcerative colitis ([facility]        GENERAL ROS:  A comprehensive review of systems was negative except for that written in the HPI.     Visit Vitals  /68 (BP 1 Location: Left upper arm, BP Patient Position: Sitting)   Pulse 77   Resp 16   Ht 5' 0.6\" (1.539 m)   Wt 134 lb (60.8 kg)   SpO2 96%   BMI 25.65 kg/m²       Wt Readings from Last 3 Encounters:   01/28/21 134 lb (60.8 kg)   10/25/19 136 lb (61.7 kg)   04/16/19 134 lb (60.8 kg)            BP Readings from Last 3 Encounters:   01/28/21 116/68   10/25/19 130/82   04/16/19 104/68       PHYSICAL EXAM  General appearance: alert, cooperative, no distress, appears stated age  Neurologic: Alert and oriented X 3  Neck: supple, symmetrical, trachea midline, no adenopathy, no carotid bruit and no JVD  Lungs: clear to auscultation bilaterally  Heart: regular rate and rhythm, S1, S2 normal, no murmur, click, rub or gallop  Extremities: extremities normal, atraumatic, no cyanosis or edema    Lab Results   Component Value Date/Time    Cholesterol, total 230 (H) 05/19/2017 11:10 AM    Cholesterol, total 277 (H) 11/23/2015 11:37 AM    Cholesterol, total 283 (H) 10/06/2014 11:28 AM    Cholesterol, total 288 (H) 10/01/2013 11:44 AM    Cholesterol, total 207 (H) 04/03/2013 01:54 PM    HDL Cholesterol 71 05/19/2017 11:10 AM    HDL Cholesterol 81 11/23/2015 11:37 AM    HDL Cholesterol 78 10/06/2014 11:28 AM    HDL Cholesterol 75 10/01/2013 11:44 AM    HDL Cholesterol 73 04/03/2013 01:54 PM    LDL, calculated 133 (H) 05/19/2017 11:10 AM    LDL, calculated 177 (H) 11/23/2015 11:37 AM    LDL, calculated 181 (H) 10/06/2014 11:28 AM    LDL, calculated 184 (H) 10/01/2013 11:44 AM    LDL, calculated 109 (H) 04/03/2013 01:54 PM    Triglyceride 132 05/19/2017 11:10 AM    Triglyceride 94 11/23/2015 11:37 AM    Triglyceride 118 10/06/2014 11:28 AM    Triglyceride 143 10/01/2013 11:44 AM    Triglyceride 126 04/03/2013 01:54 PM     ASSESSMENT :      Hopefully this is all just stress and anxiety and she kind of thinks that is what it is. The burning may be some sort of dyspepsia since she already has known ulcerative colitis. Just to be sure were going to give her a 30-day event monitor but I told her she only needs to take it and use it till she has 3 or 4 episodes of the symptoms that concern her and then she consented back. She needs to start exercising at least walking program with some sort  current treatment plan is effective, no change in therapy  lab results and schedule of future lab studies reviewed with patient  reviewed diet, exercise and weight control    Encounter Diagnoses   Name Primary?  Agatston CAC score, <100 Yes    Mixed hyperlipidemia     Heart palpitations      No orders of the defined types were placed in this encounter. Follow-up and Dispositions    · Return in about 1 year (around 1/28/2022). Melvin Stein MD  1/28/2021  Please note that this dictation was completed with [employer] the computer voice recognition software. Quite often unanticipated grammatical, syntax, homophones, and other interpretive errors are inadvertently transcribed by the computer software. Please disregard these errors.   Please excuse any errors that have escaped final proofreading. Thank you.

## 2021-01-28 NOTE — TELEPHONE ENCOUNTER
Patient calling in regards to recent visit. States that Dr. Fredy Clancy diagnosed her with anxiety and would like to know if he is able to prescribe her a mild sedative in order to sleep. States it can be sent to the [facility].     Phone: [phone]

## 2021-01-28 NOTE — TELEPHONE ENCOUNTER
Enrolled with [employer] - Ordered and being shipped to patient's home address on file. ETA within 5-7 business days. ----- Message from Ramesh Avalos RN sent at 1/28/2021  2:08 PM EST -----  Regarding: safemail  Dr. Rosalee Graham would like to have a 30 day event monitor mailed to patient. Dx palpitations, tachycardia. Thanks!   Waldemar Emerson

## 2021-01-28 NOTE — TELEPHONE ENCOUNTER
Called patient. Verified patient's identity with two identifiers. Notified her Dr. Jeny Sterling only prescribed cardiac medications so cannot send in anxiety med. Patient verbalized understanding and denied further questions or concerns.

## 2021-01-28 NOTE — PROGRESS NOTES
David Brambila is a 77 y.o. female       1. Have you been to the ER, urgent care clinic since your last visit? Hospitalized since your last visit? No        2. Have you seen or consulted any other health care providers outside of the [address] [place] [address] since your last visit? Include any pap smears or colon screening.   No

## 2021-03-12 ENCOUNTER — TELEPHONE (OUTPATIENT)
Dept: CARDIOLOGY CLINIC | Age: 76
End: 2021-03-12

## 2021-03-12 NOTE — TELEPHONE ENCOUNTER
Dr. Saida Nava-  Patient's end of service event monitor report is on your desk for review.     Future Appointments   Date Time Provider Gama Yun   1/28/2022 11:00 AM MD JOHN Carr AMB

## 2021-03-15 NOTE — TELEPHONE ENCOUNTER
Rare early extra beats(pvcs) no fast or slow rhythms. Nothing serious or that would require medications or further cardiac testing.

## 2021-05-11 ENCOUNTER — VIRTUAL VISIT (OUTPATIENT)
Dept: INTERNAL MEDICINE CLINIC | Age: 76
End: 2021-05-11
Payer: MEDICARE

## 2021-05-11 DIAGNOSIS — R21 RASH: ICD-10-CM

## 2021-05-11 DIAGNOSIS — B02.9 HERPES ZOSTER WITHOUT COMPLICATION: Primary | ICD-10-CM

## 2021-05-11 PROCEDURE — G9717 DOC PT DX DEP/BP F/U NT REQ: HCPCS | Performed by: NURSE PRACTITIONER

## 2021-05-11 PROCEDURE — G8399 PT W/DXA RESULTS DOCUMENT: HCPCS | Performed by: NURSE PRACTITIONER

## 2021-05-11 PROCEDURE — 1101F PT FALLS ASSESS-DOCD LE1/YR: CPT | Performed by: NURSE PRACTITIONER

## 2021-05-11 PROCEDURE — 3017F COLORECTAL CA SCREEN DOC REV: CPT | Performed by: NURSE PRACTITIONER

## 2021-05-11 PROCEDURE — 1090F PRES/ABSN URINE INCON ASSESS: CPT | Performed by: NURSE PRACTITIONER

## 2021-05-11 PROCEDURE — 99213 OFFICE O/P EST LOW 20 MIN: CPT | Performed by: NURSE PRACTITIONER

## 2021-05-11 PROCEDURE — G0463 HOSPITAL OUTPT CLINIC VISIT: HCPCS | Performed by: NURSE PRACTITIONER

## 2021-05-11 PROCEDURE — G8427 DOCREV CUR MEDS BY ELIG CLIN: HCPCS | Performed by: NURSE PRACTITIONER

## 2021-05-11 RX ORDER — GABAPENTIN 100 MG/1
100 CAPSULE ORAL 3 TIMES DAILY
Qty: 30 CAP | Refills: 0 | Status: SHIPPED | OUTPATIENT
Start: 2021-05-11 | End: 2021-05-21

## 2021-05-11 RX ORDER — VALACYCLOVIR HYDROCHLORIDE 1 G/1
1000 TABLET, FILM COATED ORAL 3 TIMES DAILY
Qty: 21 TAB | Refills: 0 | Status: SHIPPED | OUTPATIENT
Start: 2021-05-11 | End: 2021-05-18

## 2021-05-11 RX ORDER — METHYLPREDNISOLONE 4 MG/1
TABLET ORAL
Qty: 1 DOSE PACK | Refills: 0 | Status: SHIPPED | OUTPATIENT
Start: 2021-05-11 | End: 2022-05-07

## 2021-05-11 NOTE — PATIENT INSTRUCTIONS

## 2021-05-11 NOTE — PROGRESS NOTES
Sharon Sidhu is a 77 y.o. female who was seen by synchronous (real-time) audio-video technology on 5/11/2021. Assessment & Plan:   Below is the assessment and plan developed based on review of pertinent history, physical exam (if applicable), labs, studies, and medications. 1. Herpes zoster without complication  -     gabapentin (NEURONTIN) 100 mg capsule; Take 1 Cap by mouth three (3) times daily for 10 days. Max Daily Amount: 300 mg., Normal, Disp-30 Cap, R-0  will treat with valtrex, medrol and gabapentin  Difficulty to assess via video due to quality, but rash has qualities of zoster and contact dermatitis  Advised patient to submit photos of rash via [employer]  Follow up in office if no improvement over the next week    I spent at least 23 minutes on this visit with this established patient. ([id number])  Subjective:   Sharon Sidhu was seen for Rash  Patient reports very painful rash of right side of face, right flank/torso, and neck(wrapping around the anterior neck). She also has a new patch on left inner thigh. No known poison ivy exposure, but has had reaction in the past. She states it feels exactly like when she had shingles 6 years ago. She started valtrex that was a year old 3 days ago and feels like it might have improved the rash slightly, but it is very itchy and painful. She rates pain 10/10. She reports rash appears red with blisters on face, thigh, and waist, but looks like a red patch on her neck. Rash started on face and neck. She scrubbed her neck initially with poison ivy wash, and wonders if that made rash worse on her neck. That rash goes from left to right side. The rash on her face and waistline do not cross midline of body. Patient reports recent increased stress due to  having spine surgery 2 weeks ago. No fever, fatigue, or body aches. She had zostavax in the past, but not shingrix. Prior to Admission medications    Medication Sig Start Date End Date Taking?  Authorizing Provider   valACYclovir (VALTREX) 1 gram tablet Take 1 Tab by mouth three (3) times daily for 7 days. 5/11/21 5/18/21 Yes Lee ESCOBEDO NP   methylPREDNISolone (MEDROL DOSEPACK) 4 mg tablet Follow dose pack instructions 5/11/21  Yes Lee ESCOBEDO NP   gabapentin (NEURONTIN) 100 mg capsule Take 1 Cap by mouth three (3) times daily for 10 days. Max Daily Amount: 300 mg. 5/11/21 5/21/21 Yes Michelle Zamorano NP   fexofenadine (ALLEGRA) 180 mg tablet Take 180 mg by mouth daily as needed for Allergies. Provider, Historical   omeprazole (PRILOSEC) 40 mg capsule  8/12/19   Provider, Historical   multivitamin (ONE A DAY) tablet Take 1 Tab by mouth daily. Provider, Historical   EPINEPHrine (EPIPEN) 0.3 mg/0.3 mL injection  3/28/18   Provider, Historical   mesalamine (CANASA) 1,000 mg suppository Insert  into rectum two (2) times a day. Provider, Historical       Patient Active Problem List   Diagnosis Code    HLD (hyperlipidemia) E78.5    Recurrent major depressive disorder ([facility] F33.9    Iron deficiency anemia D50.9    Ulcerative pancolitis without complication ([facility] Y05.31    Chronic allergic rhinitis J30.9    Gastroesophageal reflux disease without esophagitis K21.9     Patient Active Problem List    Diagnosis Date Noted    Gastroesophageal reflux disease without esophagitis 10/12/2018    Chronic allergic rhinitis     Recurrent major depressive disorder ([facility] 01/28/2013    Iron deficiency anemia 01/28/2013    Ulcerative pancolitis without complication ([facility] 05/52/5417    HLD (hyperlipidemia) 11/20/2012     Current Outpatient Medications   Medication Sig Dispense Refill    valACYclovir (VALTREX) 1 gram tablet Take 1 Tab by mouth three (3) times daily for 7 days. 21 Tab 0    methylPREDNISolone (MEDROL DOSEPACK) 4 mg tablet Follow dose pack instructions 1 Dose Pack 0    gabapentin (NEURONTIN) 100 mg capsule Take 1 Cap by mouth three (3) times daily for 10 days.  Max Daily Amount: 300 mg. 30 Cap 0    fexofenadine (ALLEGRA) 180 mg tablet Take 180 mg by mouth daily as needed for Allergies.  omeprazole (PRILOSEC) 40 mg capsule       multivitamin (ONE A DAY) tablet Take 1 Tab by mouth daily.  EPINEPHrine (EPIPEN) 0.3 mg/0.3 mL injection       mesalamine (CANASA) 1,000 mg suppository Insert  into rectum two (2) times a day. Allergies   Allergen Reactions    Aspirin Other (comments)     Has ulcerative proctitis     Codeine Other (comments)     Headaches?  Hydrocodone Nausea and Vomiting    Lipitor [Atorvastatin] Myalgia    Pcn [Penicillins] Swelling     Hives      Simvastatin Other (comments)     Abdomina pain    Sulfa (Sulfonamide Antibiotics) Swelling     lips     Past Medical History:   Diagnosis Date    Allergic rhinitis     CAD (coronary artery disease)     by EBT    Depression     GERD (gastroesophageal reflux disease)     History of shingles 2011    Iron deficiency anemia     Ulcerative colitis ([facility]      Past Surgical History:   Procedure Laterality Date    HX COLONOSCOPY  2013    sigmoid colitis 2018    HX COLONOSCOPY  07/25/2018    chronic colitis    HX ENDOSCOPY  2013    HX OTHER SURGICAL  2015    BCC removed from tip of the nose      Family History   Problem Relation Age of Onset    Coronary Artery Disease Mother         heart attack in her [de-identified]    Coronary Artery Disease Father         cabg late 57's    Other Father         radiation exposure    Arthritis-osteo Sister     Breast Cancer Sister     Coronary Artery Disease Brother         s/p CABG    No Known Problems Daughter     Arthritis-osteo Brother      Social History     Tobacco Use    Smoking status: Never Smoker    Smokeless tobacco: Never Used   Substance Use Topics    Alcohol use: No     Frequency: Never     Binge frequency: Never       ROS - per HPI      Objective:   No flowsheet data found.   General: alert, cooperative, no distress   Mental  status: normal mood, behavior, speech, dress, motor activity, and thought processes, able to follow commands   Eyes: EOM intact, normal sclera   Mouth: not examined   Neck: Erythema noted across neck, worse on left side, no visible blisters, but a patch of redness noted, appears slightly raised, no visualized mass   Resp: normal effort and no respiratory distress   Neuro: no gross deficits   Musculoskeletal: normal ROM of neck   Skin: Erythematous rash with vesicles noted to right forehead, cheek, chin, right torso; left inner thigh; erythema noted across anterior neck   Psychiatric: normal affect, no hallucinations     Jasmina Elsie, was evaluated through a synchronous (real-time) audio-video encounter. The patient (or guardian if applicable) is aware that this is a billable service. Verbal consent to proceed has been obtained within the past 12 months. The visit was conducted pursuant to the emergency declaration under the [facility], [address] [place] [address] authority and the [employer] Act. Patient identification was verified, and a caregiver was present when appropriate. The patient was located in a state where the provider was credentialed to provide care.      Karen Huitron NP

## 2022-03-19 PROBLEM — K21.9 GASTROESOPHAGEAL REFLUX DISEASE WITHOUT ESOPHAGITIS: Status: ACTIVE | Noted: 2018-10-12

## 2022-05-04 NOTE — PROGRESS NOTES
Holly Delgado (: 1945) is a 77 y.o. female, patient, here for evaluation of the following chief complaint(s):  Knee Pain (right)       HPI:    She began having increased right knee pain she states 5 years ago she fell off a stool at home and injured her right knee. She states her pain now is mild, burning and intermittent. She does notice occasional swelling which seems are getting worse. Her pain is worse with kneeling and stairs and better with rest and ice. She has had previous injections. Allergies   Allergen Reactions    Aspirin Other (comments)     Has ulcerative proctitis     Codeine Other (comments)     Headaches?  Hydrocodone Nausea and Vomiting    Lipitor [Atorvastatin] Myalgia    Pcn [Penicillins] Swelling     Hives      Simvastatin Other (comments)     Abdomina pain    Sulfa (Sulfonamide Antibiotics) Swelling     lips       Current Outpatient Medications   Medication Sig    methylPREDNISolone (MEDROL DOSEPACK) 4 mg tablet Follow dose pack instructions    fexofenadine (ALLEGRA) 180 mg tablet Take 180 mg by mouth daily as needed for Allergies.  omeprazole (PRILOSEC) 40 mg capsule     multivitamin (ONE A DAY) tablet Take 1 Tab by mouth daily.  EPINEPHrine (EPIPEN) 0.3 mg/0.3 mL injection     mesalamine (CANASA) 1,000 mg suppository Insert  into rectum two (2) times a day. No current facility-administered medications for this visit.        Past Medical History:   Diagnosis Date    Allergic rhinitis     CAD (coronary artery disease)     by EBT    Depression     GERD (gastroesophageal reflux disease)     History of shingles     Iron deficiency anemia     Ulcerative colitis ([facility] [address]         Past Surgical History:   Procedure Laterality Date    HX COLONOSCOPY      sigmoid colitis     HX COLONOSCOPY  2018    chronic colitis    HX ENDOSCOPY      HX OTHER SURGICAL  2015    BCC removed from tip of the nose        Family History   Problem Relation Age of Onset    Coronary Art Dis Mother         heart attack in her [de-identified]    Coronary Art Dis Father         cabg late 57's    Other Father         radiation exposure    OSTEOARTHRITIS Sister     Breast Cancer Sister     Coronary Art Dis Brother         s/p CABG    No Known Problems Daughter     OSTEOARTHRITIS Brother         Social History     Socioeconomic History    Marital status:      Spouse name: Not on file    Number of children: Not on file    Years of education: Not on file    Highest education level: Not on file   Occupational History    Not on file   Tobacco Use    Smoking status: Never Smoker    Smokeless tobacco: Never Used   Substance and Sexual Activity    Alcohol use: No    Drug use: No    Sexual activity: Yes     Partners: Male     Birth control/protection: None   Other Topics Concern    Not on file   Social History Narrative    Not on file     Social Determinants of Health     Financial Resource Strain:     Difficulty of Paying Living Expenses: Not on file   Food Insecurity:     Worried About Running Out of Food in the Last Year: Not on file    Rhett of Food in the Last Year: Not on file   Transportation Needs:     Lack of Transportation (Medical): Not on file    Lack of Transportation (Non-Medical):  Not on file   Physical Activity:     Days of Exercise per Week: Not on file    Minutes of Exercise per Session: Not on file   Stress:     Feeling of Stress : Not on file   Social Connections:     Frequency of Communication with Friends and Family: Not on file    Frequency of Social Gatherings with Friends and Family: Not on file    Attends Hindu Services: Not on file    Active Member of Clubs or Organizations: Not on file    Attends Club or Organization Meetings: Not on file    Marital Status: Not on file   Intimate Partner Violence:     Fear of Current or Ex-Partner: Not on file    Emotionally Abused: Not on file    Physically Abused: Not on file   Cheli Larger Sexually Abused: Not on file   Housing Stability:     Unable to Pay for Housing in the Last Year: Not on file    Number of Places Lived in the Last Year: Not on file    Unstable Housing in the Last Year: Not on file       Review of Systems   All other systems reviewed and are negative. Vitals:  Ht 5' 1\" (1.549 m)   Wt 125 lb (56.7 kg)   BMI 23.62 kg/m²    Body mass index is 23.62 kg/m². Ortho Exam     The patient is well-developed and well-nourished. The patient presents today in alert and oriented x3 with a normal mood and affect. The patient stands with a normal weightbearing line but walks with a slightly antalgic gait because of her right knee pain. Right knee has significant tenderness over the lateral joint line. There is some swelling but no significant effusion. There is no ligamentous laxity and her range of motion is relatively well-maintained. Her sensations intact her pulses are 2+. Her skin is normal.    ASSESSMENT/PLAN:  XR Results (most recent):  Results from Appointment encounter on 05/05/22    XR KNEE RT MIN 4 V    Narrative  4 view x-rays of the right knee shows no evidence of a fracture or dislocation. However there is some significant lateral compartment arthrosis with significant lateral joint space narrowing. 1. Chronic pain of right knee  -     XR KNEE RT MIN 4 V; Future  2. Localized osteoarthritis of right knee       Below is the assessment and plan developed based on review of pertinent history, physical exam, labs, studies, and medications. We discussed the patient's right knee pain and her signs, symptoms, physical exam, description of her pain, and x-rays are consistent with compartment osteoarthritis of the right knee. We talked at length about treatment options including anti-inflammatory medication, activity modification possible injections of cortisone or viscosupplementation and even surgical intervention.   Consider all the above and if her pain worsens or warrants further intervention she will come back for an appointment and caution her further treatment options. Return if symptoms worsen or fail to improve. An electronic signature was used to authenticate this note.   -- Cornelio Rao MD

## 2022-05-05 ENCOUNTER — OFFICE VISIT (OUTPATIENT)
Dept: ORTHOPEDIC SURGERY | Age: 77
End: 2022-05-05
Payer: MEDICARE

## 2022-05-05 VITALS — HEIGHT: 61 IN | WEIGHT: 125 LBS | BODY MASS INDEX: 23.6 KG/M2

## 2022-05-05 DIAGNOSIS — M17.11 LOCALIZED OSTEOARTHRITIS OF RIGHT KNEE: ICD-10-CM

## 2022-05-05 DIAGNOSIS — M25.561 CHRONIC PAIN OF RIGHT KNEE: Primary | ICD-10-CM

## 2022-05-05 DIAGNOSIS — G89.29 CHRONIC PAIN OF RIGHT KNEE: Primary | ICD-10-CM

## 2022-05-05 PROCEDURE — G8427 DOCREV CUR MEDS BY ELIG CLIN: HCPCS | Performed by: ORTHOPAEDIC SURGERY

## 2022-05-05 PROCEDURE — 1101F PT FALLS ASSESS-DOCD LE1/YR: CPT | Performed by: ORTHOPAEDIC SURGERY

## 2022-05-05 PROCEDURE — 1090F PRES/ABSN URINE INCON ASSESS: CPT | Performed by: ORTHOPAEDIC SURGERY

## 2022-05-05 PROCEDURE — G9717 DOC PT DX DEP/BP F/U NT REQ: HCPCS | Performed by: ORTHOPAEDIC SURGERY

## 2022-05-05 PROCEDURE — 99213 OFFICE O/P EST LOW 20 MIN: CPT | Performed by: ORTHOPAEDIC SURGERY

## 2022-05-05 PROCEDURE — G8420 CALC BMI NORM PARAMETERS: HCPCS | Performed by: ORTHOPAEDIC SURGERY

## 2022-05-05 PROCEDURE — 3017F COLORECTAL CA SCREEN DOC REV: CPT | Performed by: ORTHOPAEDIC SURGERY

## 2022-05-05 PROCEDURE — G8399 PT W/DXA RESULTS DOCUMENT: HCPCS | Performed by: ORTHOPAEDIC SURGERY

## 2022-05-05 PROCEDURE — G8536 NO DOC ELDER MAL SCRN: HCPCS | Performed by: ORTHOPAEDIC SURGERY

## 2022-05-06 PROBLEM — G89.29 CHRONIC PAIN OF RIGHT KNEE: Status: ACTIVE | Noted: 2022-05-06

## 2022-05-06 PROBLEM — M25.561 CHRONIC PAIN OF RIGHT KNEE: Status: ACTIVE | Noted: 2022-05-06

## 2022-05-06 PROBLEM — M17.11 LOCALIZED OSTEOARTHRITIS OF RIGHT KNEE: Status: ACTIVE | Noted: 2022-05-06

## 2022-05-27 ENCOUNTER — OFFICE VISIT (OUTPATIENT)
Dept: ORTHOPEDIC SURGERY | Age: 77
End: 2022-05-27
Payer: MEDICARE

## 2022-05-27 VITALS — HEIGHT: 62 IN | WEIGHT: 125 LBS | BODY MASS INDEX: 23 KG/M2

## 2022-05-27 DIAGNOSIS — M17.11 LOCALIZED OSTEOARTHRITIS OF RIGHT KNEE: ICD-10-CM

## 2022-05-27 DIAGNOSIS — M25.561 CHRONIC PAIN OF RIGHT KNEE: Primary | ICD-10-CM

## 2022-05-27 DIAGNOSIS — G89.29 CHRONIC PAIN OF RIGHT KNEE: Primary | ICD-10-CM

## 2022-05-27 PROCEDURE — 20610 DRAIN/INJ JOINT/BURSA W/O US: CPT | Performed by: ORTHOPAEDIC SURGERY

## 2022-05-27 NOTE — PROGRESS NOTES
Alma Rosa Ford (: 1945) is a 77 y.o. female, patient, here for evaluation of the following chief complaint(s):  Knee Pain (right )       HPI:    She was last seen for right knee pain on 2022. The patient states her pain was the same as was at her last visit. She rates the severity of her right knee pain is a 3 out of 10. She describes her pain as aching, burning, and intermittent. She has been experiencing some swelling in the right knee. The patient has been taking Aleve for discomfort as needed. Allergies   Allergen Reactions    Aspirin Other (comments)     Has ulcerative proctitis     Codeine Other (comments)     Headaches?  Hydrocodone Nausea and Vomiting    Lipitor [Atorvastatin] Myalgia    Pcn [Penicillins] Swelling     Hives      Simvastatin Other (comments)     Abdomina pain    Sulfa (Sulfonamide Antibiotics) Swelling     lips       Current Outpatient Medications   Medication Sig    fexofenadine (ALLEGRA) 180 mg tablet Take 180 mg by mouth daily as needed for Allergies.  omeprazole (PRILOSEC) 40 mg capsule     mesalamine (CANASA) 1,000 mg suppository Insert  into rectum two (2) times a day. No current facility-administered medications for this visit.        Past Medical History:   Diagnosis Date    Allergic rhinitis     CAD (coronary artery disease)     by EBT    Depression     GERD (gastroesophageal reflux disease)     History of shingles     Iron deficiency anemia     Ulcerative colitis ([facility] [address]         Past Surgical History:   Procedure Laterality Date    HX COLONOSCOPY      sigmoid colitis     HX COLONOSCOPY  2018    chronic colitis    HX ENDOSCOPY      HX OTHER SURGICAL  2015    BCC removed from tip of the nose        Family History   Problem Relation Age of Onset    Coronary Art Dis Mother         heart attack in her [de-identified]    Coronary Art Dis Father         cabg late 57's    Other Father         radiation exposure    OSTEOARTHRITIS Sister     Breast Cancer Sister     Coronary Art Dis Brother         s/p CABG    No Known Problems Daughter     OSTEOARTHRITIS Brother         Social History     Socioeconomic History    Marital status:      Spouse name: Not on file    Number of children: Not on file    Years of education: Not on file    Highest education level: Not on file   Occupational History    Not on file   Tobacco Use    Smoking status: Never Smoker    Smokeless tobacco: Never Used   Substance and Sexual Activity    Alcohol use: No    Drug use: No    Sexual activity: Yes     Partners: Male     Birth control/protection: None   Other Topics Concern    Not on file   Social History Narrative    Not on file     Social Determinants of Health     Financial Resource Strain:     Difficulty of Paying Living Expenses: Not on file   Food Insecurity:     Worried About Running Out of Food in the Last Year: Not on file    Rhett of Food in the Last Year: Not on file   Transportation Needs:     Lack of Transportation (Medical): Not on file    Lack of Transportation (Non-Medical):  Not on file   Physical Activity:     Days of Exercise per Week: Not on file    Minutes of Exercise per Session: Not on file   Stress:     Feeling of Stress : Not on file   Social Connections:     Frequency of Communication with Friends and Family: Not on file    Frequency of Social Gatherings with Friends and Family: Not on file    Attends Congregational Services: Not on file    Active Member of Clubs or Organizations: Not on file    Attends Club or Organization Meetings: Not on file    Marital Status: Not on file   Intimate Partner Violence:     Fear of Current or Ex-Partner: Not on file    Emotionally Abused: Not on file    Physically Abused: Not on file    Sexually Abused: Not on file   Housing Stability:     Unable to Pay for Housing in the Last Year: Not on file    Number of [place] in the Last Year: Not on file    Unstable Housing in the Last Year: Not on file       Review of Systems   All other systems reviewed and are negative. Vitals:  Ht 5' 1.5\" (1.562 m)   Wt 125 lb (56.7 kg)   BMI 23.24 kg/m²    Body mass index is 23.24 kg/m². Ortho Exam     The patient is well-developed and well-nourished. The patient presents today in alert and oriented x3 with a normal mood and affect. The patient stands with a normal weightbearing line but walks with a slightly antalgic gait because of her right knee pain.     Right knee has significant tenderness over the lateral joint line. There is some swelling but no significant effusion. There is no ligamentous laxity and her range of motion is relatively well-maintained. Her sensations intact her pulses are 2+. Her skin is normal.    ASSESSMENT/PLAN:      1. Chronic pain of right knee  2. Localized osteoarthritis of right knee  -     hyaluronate sodium, cross-linked (GEL-ONE) injection syrg 30 mg; 30 mg, Intra artICUlar, ONCE, 1 dose, On Fri 5/27/22 at 1200       Below is the assessment and plan developed based on review of pertinent history, physical exam, labs, studies, and medications. We discussed the patient's ongoing right knee pain and osteoarthritis. The possible treatment options were discussed with the patient and we elected to inject her right knee with viscosupplementation today to try and alleviate some of her discomfort. The risks and benefits of the injection were discussed in detail with the patient and under sterile prep the patient's right knee was injected with 1 vial of Gel 1. She tolerated the injection well. I did encourage her to ice and elevate when possible, modify her activity level based on her right knee pain, and use anti-inflammatory medication when necessary. She will also work on range of motion, strengthening, and stretching exercises with an at-home exercise program as pain.   She is to avoid any deep knee bend activities against resistance, squatting, kneeling, stairs, lunging, and high impact loading activities. If she receives good pain relief from this injection we will repeat another injection at least 6 months from now. In the interim, I will see her back on an as-needed basis for right knee pain. Return if symptoms worsen or fail to improve. An electronic signature was used to authenticate this note.   -- Usman Dillon MD

## 2023-02-06 ENCOUNTER — OFFICE VISIT (OUTPATIENT)
Dept: ORTHOPEDIC SURGERY | Age: 78
End: 2023-02-06
Payer: MEDICARE

## 2023-02-06 DIAGNOSIS — M25.561 ACUTE PAIN OF RIGHT KNEE: Primary | ICD-10-CM

## 2023-02-06 DIAGNOSIS — M17.11 PRIMARY OSTEOARTHRITIS OF ONE KNEE, RIGHT: ICD-10-CM

## 2023-02-06 RX ORDER — METHYLPREDNISOLONE ACETATE 80 MG/ML
80 INJECTION, SUSPENSION INTRA-ARTICULAR; INTRALESIONAL; INTRAMUSCULAR; SOFT TISSUE ONCE
Status: COMPLETED | OUTPATIENT
Start: 2023-02-06 | End: 2023-02-06

## 2023-02-06 RX ORDER — BUPIVACAINE HYDROCHLORIDE 2.5 MG/ML
5 INJECTION, SOLUTION INFILTRATION; PERINEURAL ONCE
Status: COMPLETED | OUTPATIENT
Start: 2023-02-06 | End: 2023-02-06

## 2023-02-06 RX ADMIN — METHYLPREDNISOLONE ACETATE 80 MG: 80 INJECTION, SUSPENSION INTRA-ARTICULAR; INTRALESIONAL; INTRAMUSCULAR; SOFT TISSUE at 18:05

## 2023-02-06 RX ADMIN — BUPIVACAINE HYDROCHLORIDE 12.5 MG: 2.5 INJECTION, SOLUTION INFILTRATION; PERINEURAL at 18:04

## 2023-02-06 NOTE — LETTER
2/6/2023    Patient: Ricardo Brown   YOB: 1945   Date of Visit: 2/6/2023     Escobar Krishnamurthy MD  [address] [place] [phone]  Via Fax: [phone]    Dear Escobar Krishnamurthy MD,      Thank you for referring Ms. Daniel Mohr to [facility] for evaluation. My notes for this consultation are attached. If you have questions, please do not hesitate to call me. I look forward to following your patient along with you.       Sincerely,    Natalia Patel MD

## 2023-02-06 NOTE — PROGRESS NOTES
Stewart Carpio (: 1945) is a 69 y.o. female, patient, here for evaluation of the following chief complaint(s):  Knee Pain (Right knee )       HPI:    60-year-old female presents today for evaluation of her right knee. She states this has been bothering her for about the past year. She states she takes Tylenol intermittently for pain as needed. She also previously previously tried corticosteroid therapy as well as viscosupplementation. Her most recent injection was viscosupplementation approximately 8 months ago. She states she did not get a lot of relief from this. She feels the steroid injections she had gotten prior were more beneficial to her. The knee does not bother her every day. She mainly has issues with stairs. She was working over the holiday season and had to navigate a lot of stairs and states her knee has been more consistently painful since then. She denies any numbness or tingling down the extremity. She denies any issues with the knee giving out on her. She does not require the use of any ambulatory assistive devices    Allergies   Allergen Reactions    Aspirin Other (comments)     Has ulcerative proctitis     Codeine Other (comments)     Headaches? Hydrocodone Nausea and Vomiting    Lipitor [Atorvastatin] Myalgia    Pcn [Penicillins] Swelling     Hives      Simvastatin Other (comments)     Abdomina pain    Sulfa (Sulfonamide Antibiotics) Swelling     lips       Current Outpatient Medications   Medication Sig    fexofenadine (ALLEGRA) 180 mg tablet Take 180 mg by mouth daily as needed for Allergies. omeprazole (PRILOSEC) 40 mg capsule     mesalamine (CANASA) 1,000 mg suppository Insert  into rectum two (2) times a day. No current facility-administered medications for this visit.        Past Medical History:   Diagnosis Date    Allergic rhinitis     CAD (coronary artery disease)     by EBT    Depression     GERD (gastroesophageal reflux disease)     History of shingles 2011    Iron deficiency anemia     Ulcerative colitis [facility])         Past Surgical History:   Procedure Laterality Date    HX COLONOSCOPY  2013    sigmoid colitis 2018    HX COLONOSCOPY  07/25/2018    chronic colitis    HX ENDOSCOPY  2013    HX OTHER SURGICAL  2015    BCC removed from tip of the nose        Family History   Problem Relation Age of Onset    Coronary Art Dis Mother         heart attack in her [de-identified]    Coronary Art Dis Father         cabg late 63's    Other Father         radiation exposure    OSTEOARTHRITIS Sister     Breast Cancer Sister     Coronary Art Dis Brother         s/p CABG    No Known Problems Daughter     OSTEOARTHRITIS Brother         Social History     Socioeconomic History    Marital status:      Spouse name: Not on file    Number of children: Not on file    Years of education: Not on file    Highest education level: Not on file   Occupational History    Not on file   Tobacco Use    Smoking status: Never    Smokeless tobacco: Never   Substance and Sexual Activity    Alcohol use: No    Drug use: No    Sexual activity: Yes     Partners: Male     Birth control/protection: None   Other Topics Concern    Not on file   Social History Narrative    Not on file     Social Determinants of Health     Financial Resource Strain: Not on file   Food Insecurity: Not on file   Transportation Needs: Not on file   Physical Activity: Not on file   Stress: Not on file   Social Connections: Not on file   Intimate Partner Violence: Not on file   Housing Stability: Not on file       Review of Systems   Musculoskeletal:         Right knee pain     Vitals: There were no vitals taken for this visit. There is no height or weight on file to calculate BMI. Ortho Exam     Patient is alert and oriented x3. She is in no acute distress. She walks with a nonantalgic gait. Right knee: There is no abrasions, lacerations, ecchymosis or soft tissue swelling.   Slight valgus alignment in a weightbearing position. This is correctable when supine. Small effusion is identified. There is no pain to palpation along the medial or lateral border of the patella. There is no pain or crepitation with manipulation of the patella. There is normal excursion of the patella. Patellar grind test is negative. Active and passive range of motion is full and does not cause pain or crepitation. There is no pain with palpation along the medial femoral epicondyle or medial tibia. There is some discomfort with palpation of the lateral joint line. There is no medial joint line tenderness. Quiana's maneuver is negative. There is no collateral ligament instability. Anterior drawer, Lachman and posterior drawer are negative. There is no soft tissue swelling distally into the leg. Neurocirculatory examination is intact      Left knee: There is no abrasions, lacerations, ecchymosis or soft tissue swelling. No effusion is identified. There is no pain to palpation along the medial or lateral border of the patella. There is no pain or crepitation with manipulation of the patella. There is normal excursion of the patella. Patellar grind test is negative. Active and passive range of motion is full and does not cause pain or crepitation. There is no pain with palpation along the medial femoral epicondyle or medial tibia and no pain with palpation over the lateral femoral epicondyle. There is no medial or lateral joint line tenderness. Quiana's maneuver is negative. There is no collateral ligament instability. Anterior drawer, Lachman and posterior drawer are negative. There is no soft tissue swelling distally into the leg. Neurocirculatory examination is intact      XRAY: Radiographs of the right knee were taken in office and reviewed today. Patient has findings of advanced degenerative joint disease in the lateral compartment.   The joint space of the medial compartment and patellofemoral joint are well-preserved in the right knee and are well-preserved tricompartmentally in the left knee        XR Results (most recent):  Results from Appointment encounter on 05/05/22    XR KNEE RT MIN 4 V    Narrative  4 view x-rays of the right knee shows no evidence of a fracture or dislocation. However there is some significant lateral compartment arthrosis with significant lateral joint space narrowing. ASSESSMENT/PLAN:    The patient was again reminded of the state of her degenerative joint disease to the right knee. This is seemingly isolated to the lateral compartment and her symptoms and physical exam would corroborate this. She is still not significantly bothered by this on a day-to-day basis and states she was doing quite well until she had to navigate a lot of stairs over the holidays. We revisited the treatment options regarding her knee in office today. Given her previous positive response to corticosteroid therapy we recommended proceeding forward with aspiration and corticosteroid injection. Consent for the injection was obtained. Risk of postinjection infection, lack of improvement, hypopigmentation and unusual allergic reaction were explained to the patient. After consent, the skin was sterilely prepped. 5 Cc of sterile joint fluid was aspirated and then and 80 mg of Depo-Medrol and 5 cc of 0.25% plain Marcaine was was injected in the right knee. Patient had no complications. Patient is to ice modify activities for 24 hours.   Patient is to return to the office if no improvement          Inez Butts MD

## 2024-07-25 PROBLEM — M17.11 ARTHRITIS OF KNEE, RIGHT: Status: ACTIVE | Noted: 2024-07-25

## 2024-10-24 ENCOUNTER — HOSPITAL ENCOUNTER (OUTPATIENT)
Facility: HOSPITAL | Age: 79
Discharge: HOME | End: 2024-10-27
Payer: MEDICARE

## 2024-10-24 VITALS
SYSTOLIC BLOOD PRESSURE: 136 MMHG | RESPIRATION RATE: 18 BRPM | WEIGHT: 119.71 LBS | TEMPERATURE: 97.8 F | DIASTOLIC BLOOD PRESSURE: 66 MMHG | HEIGHT: 61 IN | HEART RATE: 85 BPM | BODY MASS INDEX: 22.6 KG/M2

## 2024-10-24 LAB
ABO + RH BLD: NORMAL
APPEARANCE UR: CLEAR
BACTERIA URNS QL MICRO: NEGATIVE /HPF
BILIRUB UR QL: NEGATIVE
BLOOD GROUP ANTIBODIES SERPL: NORMAL
COLOR UR: ABNORMAL
EPITH CASTS URNS QL MICRO: ABNORMAL /LPF
GLUCOSE UR STRIP.AUTO-MCNC: NEGATIVE MG/DL
HGB UR QL STRIP: NEGATIVE
HYALINE CASTS URNS QL MICRO: ABNORMAL /LPF (ref 0–5)
KETONES UR QL STRIP.AUTO: NEGATIVE MG/DL
LEUKOCYTE ESTERASE UR QL STRIP.AUTO: ABNORMAL
NITRITE UR QL STRIP.AUTO: NEGATIVE
PH UR STRIP: 5 (ref 5–8)
PROT UR STRIP-MCNC: NEGATIVE MG/DL
RBC #/AREA URNS HPF: ABNORMAL /HPF (ref 0–5)
SP GR UR REFRACTOMETRY: 1.02 (ref 1–1.03)
SPECIMEN EXP DATE BLD: NORMAL
URINE CULTURE IF INDICATED: ABNORMAL
UROBILINOGEN UR QL STRIP.AUTO: 0.2 EU/DL (ref 0.2–1)
WBC URNS QL MICRO: ABNORMAL /HPF (ref 0–4)

## 2024-10-24 PROCEDURE — 86900 BLOOD TYPING SEROLOGIC ABO: CPT

## 2024-10-24 PROCEDURE — 36415 COLL VENOUS BLD VENIPUNCTURE: CPT

## 2024-10-24 PROCEDURE — 86850 RBC ANTIBODY SCREEN: CPT

## 2024-10-24 PROCEDURE — 81001 URINALYSIS AUTO W/SCOPE: CPT

## 2024-10-24 PROCEDURE — 86901 BLOOD TYPING SEROLOGIC RH(D): CPT

## 2024-10-24 RX ORDER — VITAMIN E 268 MG
400 CAPSULE ORAL DAILY
COMMUNITY

## 2024-10-24 RX ORDER — CHOLECALCIFEROL (VITAMIN D3) 1250 MCG
1 CAPSULE ORAL DAILY
COMMUNITY

## 2024-10-24 RX ORDER — ASCORBIC ACID 500 MG
500 TABLET ORAL DAILY
COMMUNITY

## 2024-10-24 RX ORDER — PYRIDOXINE HCL (VITAMIN B6) 50 MG
1 TABLET ORAL DAILY
COMMUNITY

## 2024-10-24 RX ORDER — IPRATROPIUM BROMIDE 21 UG/1
2 SPRAY, METERED NASAL AS NEEDED
COMMUNITY

## 2024-10-24 ASSESSMENT — PROMIS GLOBAL HEALTH SCALE
IN GENERAL, PLEASE RATE HOW WELL YOU CARRY OUT YOUR USUAL SOCIAL ACTIVITIES (INCLUDES ACTIVITIES AT HOME, AT WORK, AND IN YOUR COMMUNITY, AND RESPONSIBILITIES AS A PARENT, CHILD, SPOUSE, EMPLOYEE, FRIEND, ETC) [ON A SCALE OF 1 (POOR) TO 5 (EXCELLENT)]?: EXCELLENT
SUM OF RESPONSES TO QUESTIONS 3, 6, 7, & 8: 14
IN GENERAL, HOW WOULD YOU RATE YOUR MENTAL HEALTH, INCLUDING YOUR MOOD AND YOUR ABILITY TO THINK [ON A SCALE OF 1 (POOR) TO 5 (EXCELLENT)]?: VERY GOOD
WHO IS THE PERSON COMPLETING THE PROMIS V1.1 SURVEY?: SELF
IN THE PAST 7 DAYS, HOW WOULD YOU RATE YOUR FATIGUE ON AVERAGE [ON A SCALE FROM 1 (NONE) TO 5 (VERY SEVERE)]?: MILD
TO WHAT EXTENT ARE YOU ABLE TO CARRY OUT YOUR EVERYDAY PHYSICAL ACTIVITIES SUCH AS WALKING, CLIMBING STAIRS, CARRYING GROCERIES, OR MOVING A CHAIR [ON A SCALE OF 1 (NOT AT ALL) TO 5 (COMPLETELY)]?: COMPLETELY
IN GENERAL, HOW WOULD YOU RATE YOUR PHYSICAL HEALTH [ON A SCALE OF 1 (POOR) TO 5 (EXCELLENT)]?: VERY GOOD
HOW IS THE PROMIS V1.1 BEING ADMINISTERED?: PAPER
IN THE PAST 7 DAYS, HOW WOULD YOU RATE YOUR PAIN ON AVERAGE [ON A SCALE FROM 0 (NO PAIN) TO 10 (WORST IMAGINABLE PAIN)]?: 1
IN THE PAST 7 DAYS, HOW OFTEN HAVE YOU BEEN BOTHERED BY EMOTIONAL PROBLEMS, SUCH AS FEELING ANXIOUS, DEPRESSED, OR IRRITABLE [ON A SCALE FROM 1 (NEVER) TO 5 (ALWAYS)]?: SOMETIMES
SUM OF RESPONSES TO QUESTIONS 2, 4, 5, & 10: 16
IN GENERAL, WOULD YOU SAY YOUR QUALITY OF LIFE IS...[ON A SCALE OF 1 (POOR) TO 5 (EXCELLENT)]: EXCELLENT
IN GENERAL, HOW WOULD YOU RATE YOUR SATISFACTION WITH YOUR SOCIAL ACTIVITIES AND RELATIONSHIPS [ON A SCALE OF 1 (POOR) TO 5 (EXCELLENT)]?: VERY GOOD
IN GENERAL, WOULD YOU SAY YOUR HEALTH IS...[ON A SCALE OF 1 (POOR) TO 5 (EXCELLENT)]: VERY GOOD

## 2024-10-24 ASSESSMENT — KOOS JR
HOW SEVERE IS YOUR KNEE STIFFNESS AFTER FIRST WAKING IN MORNING: MILD
KOOS JR TOTAL INTERVAL SCORE: 76.332
GOING UP OR DOWN STAIRS: MILD
STRAIGHTENING KNEE FULLY: MILD
BENDING TO THE FLOOR TO PICK UP OBJECT: MILD

## 2024-10-24 ASSESSMENT — PAIN DESCRIPTION - DESCRIPTORS: DESCRIPTORS: DULL

## 2024-10-24 ASSESSMENT — PAIN DESCRIPTION - FREQUENCY: FREQUENCY: CONTINUOUS

## 2024-10-24 ASSESSMENT — PAIN DESCRIPTION - PAIN TYPE: TYPE: CHRONIC PAIN

## 2024-10-24 ASSESSMENT — PAIN DESCRIPTION - LOCATION: LOCATION: KNEE

## 2024-10-24 ASSESSMENT — PAIN DESCRIPTION - ORIENTATION: ORIENTATION: RIGHT

## 2024-10-24 ASSESSMENT — PAIN SCALES - GENERAL: PAINLEVEL_OUTOF10: 2

## 2024-10-24 NOTE — PROGRESS NOTES
10/24/24 pt attended in person class. all questions and concerns were addressed at this time. wondered if she could go to [facility] after surgery - encouraged d/c home assistance with . stated recent family death and  currently in hospital and uncertain of his discharge disposition or ability to assist after her surgery.      VM left for surgeon's team on updates in family situation and concerns.     ALEX AND TREY COMPELTED

## 2024-10-24 NOTE — PERIOP NOTE
[address] [address]   MAIN OR                     [phone]    MAIN PRE OP             [phone]                                                                                AMBULATORY PRE OP          [phone]  PRE-ADMISSION TESTING    [phone]     Surgery Date:  11/7/24       Is surgery arrival time given by surgeon?  YES  NO    If “NO”, [facility] staff will call you between 4 and 7pm the day before your surgery with your arrival time. (If your surgery is on a Monday, we will call you the Friday before.)    Call [phone] after 7pm Monday-Friday if you did not receive this call.    INSTRUCTIONS BEFORE YOUR SURGERY   When You  Arrive Arrive at [facility] Patient [facility] on 1st floor the day of your surgery.  Have your insurance card, photo ID,living will/advanced directive/POA (if applicable),  and any copayment (if needed)   Food   and   Drink NO solid food after midnight the night before surgery. You can drink clear liquids from midnight until ONE hour prior to your arrival at the hospital on the day of your surgery. Clear liquids include:  Water  Fruit juices without pulp  Carbonated beverages  Black coffee(no cream/milk)  Tea(no cream/milk)  Gatorade    No alcohol (beer, wine, liquor) or marijuana (smoking) 24 hours, edibles (3 days). Stop smoking cigarettes 14 days before surgery (helps w/healing and breathing).   Medications to   TAKE   Morning of Surgery MEDICATIONS TO TAKE THE MORNING OF SURGERY WITH A SIP OF WATER:   NONE  You may take these medications, IF NEEDED, the morning of surgery: BENADRYL, ALLEGRA, ATROVENT, OMEPRAZOLE  Ask your surgeon/prescribing doctor for instructions on taking or stopping these medications prior to surgery: NONE   Medications to STOP  before surgery Non-Steroidal anti-inflammatory Drugs (NSAID's): for example, Ibuprofen (Advil, Motrin), Naproxen (Aleve) 3 days  STOP all herbal  INSTRUCTIONS      You play an important role in your health and preparation for surgery. To reduce the germs on your skin you will need to shower with CHG soap (Chorhexidine gluconate 4%) two times before surgery.    CHG soap (Hibiclens, Hex-A-Clens or store brand)  CHG soap will be provided at your Preadmission Testing (PAT) appointment.  If you do not have a PAT appointment before surgery, you may arrange to  CHG soap from our office or purchase CHG soap at a pharmacy, grocery or department store.  You need to purchase TWO 4 ounce bottles to use for your 2 showers.    Steps to follow:  Wash your hair with your normal shampoo and your body with regular soap and rinse well to remove shampoo and soap from your skin.  Wet a clean washcloth and turn off the shower.  Put CHG soap on washcloth and apply to your entire body from the neck down. Do not use on your head, face or private parts(genitals). Do not use CHG soap on open sores, wounds or areas of skin irritation.  Wash you body gently for 5 minutes. Do not wash your skin too hard. This soap does not create lather. Pay special attention to your underarms and from your belly button to your feet.  Turn the shower back on and rinse well to get CHG soap off your body.  Pat your skin dry with a clean, dry towel. Do not apply lotions or moisturizer.  Put on clean clothes and sleep on fresh bed sheets and do not allow pets to sleep with you.    Shower with CHG soap 2 times before your surgery  The evening before your surgery  The morning of your surgery      Tips to help prevent infections after your surgery:  Protect your surgical wound from germs:  Hand washing is the most important thing you and your caregivers can do to prevent infections.  Keep your bandage clean and dry!  Do not touch your surgical wound.  Use clean, freshly washed towels and washcloths every time you shower; do not share bath linens with others.  Until your surgical wound is healed, wear

## 2024-10-25 LAB
BACTERIA SPEC CULT: NORMAL
BACTERIA SPEC CULT: NORMAL
SERVICE CMNT-IMP: NORMAL

## 2024-11-06 ENCOUNTER — ANESTHESIA EVENT (OUTPATIENT)
Facility: HOSPITAL | Age: 79
End: 2024-11-06
Payer: MEDICARE

## 2024-11-07 ENCOUNTER — ANESTHESIA (OUTPATIENT)
Facility: HOSPITAL | Age: 79
End: 2024-11-07
Payer: MEDICARE

## 2024-11-07 ENCOUNTER — HOSPITAL ENCOUNTER (OUTPATIENT)
Facility: HOSPITAL | Age: 79
Setting detail: OBSERVATION
Discharge: HOME HEALTH | End: 2024-11-10
Attending: ORTHOPAEDIC SURGERY | Admitting: ORTHOPAEDIC SURGERY
Payer: MEDICARE

## 2024-11-07 DIAGNOSIS — Z96.651 HISTORY OF TOTAL KNEE ARTHROPLASTY, RIGHT: ICD-10-CM

## 2024-11-07 DIAGNOSIS — M17.11 PRIMARY OSTEOARTHRITIS OF RIGHT KNEE: Primary | ICD-10-CM

## 2024-11-07 LAB
GLUCOSE BLD STRIP.AUTO-MCNC: 89 MG/DL (ref 65–117)
INR PPP: 1 (ref 0.9–1.1)
PROTHROMBIN TIME: 10.5 SEC (ref 9–11.1)
SERVICE CMNT-IMP: NORMAL

## 2024-11-07 PROCEDURE — 2580000003 HC RX 258: Performed by: ORTHOPAEDIC SURGERY

## 2024-11-07 PROCEDURE — C1776 JOINT DEVICE (IMPLANTABLE): HCPCS | Performed by: ORTHOPAEDIC SURGERY

## 2024-11-07 PROCEDURE — 6360000002 HC RX W HCPCS: Performed by: ANESTHESIOLOGY

## 2024-11-07 PROCEDURE — 85610 PROTHROMBIN TIME: CPT

## 2024-11-07 PROCEDURE — 97530 THERAPEUTIC ACTIVITIES: CPT

## 2024-11-07 PROCEDURE — 6370000000 HC RX 637 (ALT 250 FOR IP)

## 2024-11-07 PROCEDURE — 3600000005 HC SURGERY LEVEL 5 BASE: Performed by: ORTHOPAEDIC SURGERY

## 2024-11-07 PROCEDURE — 82962 GLUCOSE BLOOD TEST: CPT

## 2024-11-07 PROCEDURE — G0378 HOSPITAL OBSERVATION PER HR: HCPCS

## 2024-11-07 PROCEDURE — 3600000015 HC SURGERY LEVEL 5 ADDTL 15MIN: Performed by: ORTHOPAEDIC SURGERY

## 2024-11-07 PROCEDURE — 6360000002 HC RX W HCPCS: Performed by: ORTHOPAEDIC SURGERY

## 2024-11-07 PROCEDURE — 6370000000 HC RX 637 (ALT 250 FOR IP): Performed by: ORTHOPAEDIC SURGERY

## 2024-11-07 PROCEDURE — C9290 INJ, BUPIVACAINE LIPOSOME: HCPCS | Performed by: ORTHOPAEDIC SURGERY

## 2024-11-07 PROCEDURE — 6370000000 HC RX 637 (ALT 250 FOR IP): Performed by: ANESTHESIOLOGY

## 2024-11-07 PROCEDURE — 6360000002 HC RX W HCPCS

## 2024-11-07 PROCEDURE — 64447 NJX AA&/STRD FEMORAL NRV IMG: CPT | Performed by: ANESTHESIOLOGY

## 2024-11-07 PROCEDURE — 3700000001 HC ADD 15 MINUTES (ANESTHESIA): Performed by: ORTHOPAEDIC SURGERY

## 2024-11-07 PROCEDURE — 36415 COLL VENOUS BLD VENIPUNCTURE: CPT

## 2024-11-07 PROCEDURE — 97116 GAIT TRAINING THERAPY: CPT

## 2024-11-07 PROCEDURE — 6360000002 HC RX W HCPCS: Performed by: STUDENT IN AN ORGANIZED HEALTH CARE EDUCATION/TRAINING PROGRAM

## 2024-11-07 PROCEDURE — 2580000003 HC RX 258: Performed by: NURSE ANESTHETIST, CERTIFIED REGISTERED

## 2024-11-07 PROCEDURE — C1713 ANCHOR/SCREW BN/BN,TIS/BN: HCPCS | Performed by: ORTHOPAEDIC SURGERY

## 2024-11-07 PROCEDURE — 2500000003 HC RX 250 WO HCPCS: Performed by: NURSE ANESTHETIST, CERTIFIED REGISTERED

## 2024-11-07 PROCEDURE — 7100000001 HC PACU RECOVERY - ADDTL 15 MIN: Performed by: ORTHOPAEDIC SURGERY

## 2024-11-07 PROCEDURE — 2709999900 HC NON-CHARGEABLE SUPPLY: Performed by: ORTHOPAEDIC SURGERY

## 2024-11-07 PROCEDURE — 2500000003 HC RX 250 WO HCPCS: Performed by: ORTHOPAEDIC SURGERY

## 2024-11-07 PROCEDURE — 6360000002 HC RX W HCPCS: Performed by: NURSE ANESTHETIST, CERTIFIED REGISTERED

## 2024-11-07 PROCEDURE — 97161 PT EVAL LOW COMPLEX 20 MIN: CPT

## 2024-11-07 PROCEDURE — 7100000000 HC PACU RECOVERY - FIRST 15 MIN: Performed by: ORTHOPAEDIC SURGERY

## 2024-11-07 PROCEDURE — 3700000000 HC ANESTHESIA ATTENDED CARE: Performed by: ORTHOPAEDIC SURGERY

## 2024-11-07 DEVICE — COMPONENT TOT KNEE CAPPED K1 STD HEMI CEM: Type: IMPLANTABLE DEVICE | Site: KNEE | Status: FUNCTIONAL

## 2024-11-07 DEVICE — IMPLANTABLE DEVICE
Type: IMPLANTABLE DEVICE | Site: KNEE | Status: FUNCTIONAL
Brand: OPTETRAK LOGIC

## 2024-11-07 DEVICE — CRC TIBIAL INSERT
Type: IMPLANTABLE DEVICE | Site: KNEE | Status: FUNCTIONAL
Brand: TRULIANT, ACTIVIT-E

## 2024-11-07 DEVICE — IMPLANTABLE DEVICE
Type: IMPLANTABLE DEVICE | Site: KNEE | Status: FUNCTIONAL
Brand: TRULIANT

## 2024-11-07 DEVICE — PATELLA
Type: IMPLANTABLE DEVICE | Site: KNEE | Status: FUNCTIONAL
Brand: TRULIANT, ACTIVIT-E

## 2024-11-07 RX ORDER — ONDANSETRON 4 MG/1
4 TABLET, ORALLY DISINTEGRATING ORAL EVERY 8 HOURS PRN
Status: DISCONTINUED | OUTPATIENT
Start: 2024-11-07 | End: 2024-11-10 | Stop reason: HOSPADM

## 2024-11-07 RX ORDER — HYDROMORPHONE HYDROCHLORIDE 1 MG/ML
0.5 INJECTION, SOLUTION INTRAMUSCULAR; INTRAVENOUS; SUBCUTANEOUS EVERY 5 MIN PRN
Status: COMPLETED | OUTPATIENT
Start: 2024-11-07 | End: 2024-11-07

## 2024-11-07 RX ORDER — SODIUM CHLORIDE 9 MG/ML
INJECTION, SOLUTION INTRAVENOUS CONTINUOUS
Status: DISCONTINUED | OUTPATIENT
Start: 2024-11-07 | End: 2024-11-10 | Stop reason: HOSPADM

## 2024-11-07 RX ORDER — SODIUM CHLORIDE 0.9 % (FLUSH) 0.9 %
5-40 SYRINGE (ML) INJECTION EVERY 12 HOURS SCHEDULED
Status: DISCONTINUED | OUTPATIENT
Start: 2024-11-07 | End: 2024-11-07 | Stop reason: HOSPADM

## 2024-11-07 RX ORDER — DEXAMETHASONE SODIUM PHOSPHATE 10 MG/ML
8 INJECTION, SOLUTION INTRAMUSCULAR; INTRAVENOUS ONCE
Status: DISCONTINUED | OUTPATIENT
Start: 2024-11-07 | End: 2024-11-07 | Stop reason: HOSPADM

## 2024-11-07 RX ORDER — OXYCODONE HYDROCHLORIDE 5 MG/1
10 TABLET ORAL EVERY 4 HOURS PRN
Status: DISCONTINUED | OUTPATIENT
Start: 2024-11-07 | End: 2024-11-10 | Stop reason: HOSPADM

## 2024-11-07 RX ORDER — ONDANSETRON 2 MG/ML
4 INJECTION INTRAMUSCULAR; INTRAVENOUS
Status: DISCONTINUED | OUTPATIENT
Start: 2024-11-07 | End: 2024-11-07 | Stop reason: HOSPADM

## 2024-11-07 RX ORDER — SODIUM CHLORIDE 9 MG/ML
INJECTION, SOLUTION INTRAVENOUS PRN
Status: DISCONTINUED | OUTPATIENT
Start: 2024-11-07 | End: 2024-11-07 | Stop reason: HOSPADM

## 2024-11-07 RX ORDER — ACETAMINOPHEN 325 MG/1
650 TABLET ORAL EVERY 6 HOURS
Status: DISCONTINUED | OUTPATIENT
Start: 2024-11-07 | End: 2024-11-10 | Stop reason: HOSPADM

## 2024-11-07 RX ORDER — ACETAMINOPHEN 500 MG
1000 TABLET ORAL ONCE
Status: DISCONTINUED | OUTPATIENT
Start: 2024-11-07 | End: 2024-11-07 | Stop reason: HOSPADM

## 2024-11-07 RX ORDER — PROPOFOL 10 MG/ML
INJECTION, EMULSION INTRAVENOUS
Status: DISCONTINUED | OUTPATIENT
Start: 2024-11-07 | End: 2024-11-07 | Stop reason: SDUPTHER

## 2024-11-07 RX ORDER — PROCHLORPERAZINE EDISYLATE 5 MG/ML
5 INJECTION INTRAMUSCULAR; INTRAVENOUS
Status: DISCONTINUED | OUTPATIENT
Start: 2024-11-07 | End: 2024-11-07 | Stop reason: HOSPADM

## 2024-11-07 RX ORDER — SODIUM CHLORIDE 0.9 % (FLUSH) 0.9 %
5-40 SYRINGE (ML) INJECTION PRN
Status: DISCONTINUED | OUTPATIENT
Start: 2024-11-07 | End: 2024-11-07 | Stop reason: HOSPADM

## 2024-11-07 RX ORDER — SODIUM CHLORIDE, SODIUM LACTATE, POTASSIUM CHLORIDE, CALCIUM CHLORIDE 600; 310; 30; 20 MG/100ML; MG/100ML; MG/100ML; MG/100ML
INJECTION, SOLUTION INTRAVENOUS CONTINUOUS
Status: DISCONTINUED | OUTPATIENT
Start: 2024-11-07 | End: 2024-11-07 | Stop reason: HOSPADM

## 2024-11-07 RX ORDER — FENTANYL CITRATE 50 UG/ML
25 INJECTION, SOLUTION INTRAMUSCULAR; INTRAVENOUS EVERY 5 MIN PRN
Status: DISCONTINUED | OUTPATIENT
Start: 2024-11-07 | End: 2024-11-07 | Stop reason: HOSPADM

## 2024-11-07 RX ORDER — SODIUM CHLORIDE 0.9 % (FLUSH) 0.9 %
5-40 SYRINGE (ML) INJECTION EVERY 12 HOURS SCHEDULED
Status: DISCONTINUED | OUTPATIENT
Start: 2024-11-07 | End: 2024-11-10 | Stop reason: HOSPADM

## 2024-11-07 RX ORDER — SENNOSIDES A AND B 8.6 MG/1
1 TABLET, FILM COATED ORAL DAILY PRN
Status: DISCONTINUED | OUTPATIENT
Start: 2024-11-07 | End: 2024-11-10 | Stop reason: HOSPADM

## 2024-11-07 RX ORDER — OXYCODONE HYDROCHLORIDE 5 MG/1
5 TABLET ORAL EVERY 4 HOURS PRN
Status: DISCONTINUED | OUTPATIENT
Start: 2024-11-07 | End: 2024-11-10 | Stop reason: HOSPADM

## 2024-11-07 RX ORDER — ACETAMINOPHEN 500 MG
1000 TABLET ORAL ONCE
Status: COMPLETED | OUTPATIENT
Start: 2024-11-07 | End: 2024-11-07

## 2024-11-07 RX ORDER — LIDOCAINE HYDROCHLORIDE 10 MG/ML
1 INJECTION, SOLUTION EPIDURAL; INFILTRATION; INTRACAUDAL; PERINEURAL
Status: DISCONTINUED | OUTPATIENT
Start: 2024-11-07 | End: 2024-11-07 | Stop reason: HOSPADM

## 2024-11-07 RX ORDER — HYDROXYZINE HYDROCHLORIDE 10 MG/1
10 TABLET, FILM COATED ORAL EVERY 8 HOURS PRN
Status: DISCONTINUED | OUTPATIENT
Start: 2024-11-07 | End: 2024-11-10 | Stop reason: HOSPADM

## 2024-11-07 RX ORDER — ROPIVACAINE HYDROCHLORIDE 5 MG/ML
30 INJECTION, SOLUTION EPIDURAL; INFILTRATION; PERINEURAL ONCE
Status: DISCONTINUED | OUTPATIENT
Start: 2024-11-07 | End: 2024-11-07 | Stop reason: HOSPADM

## 2024-11-07 RX ORDER — ROPIVACAINE HYDROCHLORIDE 5 MG/ML
INJECTION, SOLUTION EPIDURAL; INFILTRATION; PERINEURAL
Status: COMPLETED | OUTPATIENT
Start: 2024-11-07 | End: 2024-11-07

## 2024-11-07 RX ORDER — SODIUM CHLORIDE 9 MG/ML
INJECTION, SOLUTION INTRAVENOUS PRN
Status: DISCONTINUED | OUTPATIENT
Start: 2024-11-07 | End: 2024-11-10 | Stop reason: HOSPADM

## 2024-11-07 RX ORDER — GINSENG 100 MG
CAPSULE ORAL PRN
Status: DISCONTINUED | OUTPATIENT
Start: 2024-11-07 | End: 2024-11-07 | Stop reason: HOSPADM

## 2024-11-07 RX ORDER — WARFARIN SODIUM 5 MG/1
5 TABLET ORAL
Status: COMPLETED | OUTPATIENT
Start: 2024-11-07 | End: 2024-11-07

## 2024-11-07 RX ORDER — GINSENG 100 MG
CAPSULE ORAL 3 TIMES DAILY
Status: DISCONTINUED | OUTPATIENT
Start: 2024-11-07 | End: 2024-11-07

## 2024-11-07 RX ORDER — ONDANSETRON 2 MG/ML
INJECTION INTRAMUSCULAR; INTRAVENOUS
Status: DISCONTINUED | OUTPATIENT
Start: 2024-11-07 | End: 2024-11-07 | Stop reason: SDUPTHER

## 2024-11-07 RX ORDER — EPHEDRINE SULFATE 50 MG/ML
INJECTION INTRAVENOUS
Status: DISCONTINUED | OUTPATIENT
Start: 2024-11-07 | End: 2024-11-07 | Stop reason: SDUPTHER

## 2024-11-07 RX ORDER — CARBOXYMETHYLCELLULOSE SODIUM 5 MG/ML
1 SOLUTION/ DROPS OPHTHALMIC PRN
Status: DISCONTINUED | OUTPATIENT
Start: 2024-11-07 | End: 2024-11-10 | Stop reason: HOSPADM

## 2024-11-07 RX ORDER — PROCHLORPERAZINE EDISYLATE 5 MG/ML
5 INJECTION INTRAMUSCULAR; INTRAVENOUS EVERY 4 HOURS PRN
Status: DISCONTINUED | OUTPATIENT
Start: 2024-11-07 | End: 2024-11-10 | Stop reason: HOSPADM

## 2024-11-07 RX ORDER — OXYCODONE HYDROCHLORIDE 5 MG/1
5-10 TABLET ORAL EVERY 6 HOURS PRN
Qty: 40 TABLET | Refills: 0 | Status: SHIPPED | OUTPATIENT
Start: 2024-11-07 | End: 2024-11-14

## 2024-11-07 RX ORDER — SODIUM CHLORIDE 0.9 % (FLUSH) 0.9 %
5-40 SYRINGE (ML) INJECTION PRN
Status: DISCONTINUED | OUTPATIENT
Start: 2024-11-07 | End: 2024-11-10 | Stop reason: HOSPADM

## 2024-11-07 RX ORDER — IPRATROPIUM BROMIDE 21 UG/1
2 SPRAY, METERED NASAL DAILY PRN
Status: DISCONTINUED | OUTPATIENT
Start: 2024-11-08 | End: 2024-11-10 | Stop reason: HOSPADM

## 2024-11-07 RX ORDER — CETIRIZINE HYDROCHLORIDE 10 MG/1
5 TABLET ORAL DAILY
Status: DISCONTINUED | OUTPATIENT
Start: 2024-11-08 | End: 2024-11-10 | Stop reason: HOSPADM

## 2024-11-07 RX ORDER — LIDOCAINE HYDROCHLORIDE 20 MG/ML
INJECTION, SOLUTION EPIDURAL; INFILTRATION; INTRACAUDAL; PERINEURAL
Status: DISCONTINUED | OUTPATIENT
Start: 2024-11-07 | End: 2024-11-07 | Stop reason: SDUPTHER

## 2024-11-07 RX ORDER — LORAZEPAM 2 MG/ML
0.25 INJECTION INTRAMUSCULAR
Status: DISCONTINUED | OUTPATIENT
Start: 2024-11-07 | End: 2024-11-07 | Stop reason: HOSPADM

## 2024-11-07 RX ORDER — LORAZEPAM 2 MG/ML
INJECTION INTRAMUSCULAR
Status: COMPLETED
Start: 2024-11-07 | End: 2024-11-07

## 2024-11-07 RX ORDER — HYDRALAZINE HYDROCHLORIDE 20 MG/ML
10 INJECTION INTRAMUSCULAR; INTRAVENOUS
Status: DISCONTINUED | OUTPATIENT
Start: 2024-11-07 | End: 2024-11-07 | Stop reason: HOSPADM

## 2024-11-07 RX ORDER — FAMOTIDINE 20 MG/1
20 TABLET, FILM COATED ORAL 2 TIMES DAILY
Status: DISCONTINUED | OUTPATIENT
Start: 2024-11-07 | End: 2024-11-10 | Stop reason: HOSPADM

## 2024-11-07 RX ORDER — HYDROMORPHONE HYDROCHLORIDE 1 MG/ML
0.5 INJECTION, SOLUTION INTRAMUSCULAR; INTRAVENOUS; SUBCUTANEOUS
Status: DISCONTINUED | OUTPATIENT
Start: 2024-11-07 | End: 2024-11-10 | Stop reason: HOSPADM

## 2024-11-07 RX ORDER — NALOXONE HYDROCHLORIDE 0.4 MG/ML
INJECTION, SOLUTION INTRAMUSCULAR; INTRAVENOUS; SUBCUTANEOUS PRN
Status: DISCONTINUED | OUTPATIENT
Start: 2024-11-07 | End: 2024-11-07 | Stop reason: HOSPADM

## 2024-11-07 RX ORDER — TRANEXAMIC ACID 100 MG/ML
INJECTION, SOLUTION INTRAVENOUS PRN
Status: DISCONTINUED | OUTPATIENT
Start: 2024-11-07 | End: 2024-11-07 | Stop reason: HOSPADM

## 2024-11-07 RX ORDER — BISACODYL 5 MG/1
5 TABLET, DELAYED RELEASE ORAL DAILY PRN
Status: DISCONTINUED | OUTPATIENT
Start: 2024-11-08 | End: 2024-11-10 | Stop reason: HOSPADM

## 2024-11-07 RX ORDER — WARFARIN SODIUM 2.5 MG/1
TABLET ORAL
Qty: 60 TABLET | Refills: 3 | Status: SHIPPED | OUTPATIENT
Start: 2024-11-07

## 2024-11-07 RX ORDER — MIDAZOLAM HYDROCHLORIDE 2 MG/2ML
2 INJECTION, SOLUTION INTRAMUSCULAR; INTRAVENOUS PRN
Status: DISCONTINUED | OUTPATIENT
Start: 2024-11-07 | End: 2024-11-07 | Stop reason: HOSPADM

## 2024-11-07 RX ORDER — OXYCODONE HYDROCHLORIDE 5 MG/1
5 TABLET ORAL
Status: DISCONTINUED | OUTPATIENT
Start: 2024-11-07 | End: 2024-11-07 | Stop reason: HOSPADM

## 2024-11-07 RX ORDER — ONDANSETRON 2 MG/ML
4 INJECTION INTRAMUSCULAR; INTRAVENOUS EVERY 6 HOURS PRN
Status: DISCONTINUED | OUTPATIENT
Start: 2024-11-07 | End: 2024-11-10 | Stop reason: HOSPADM

## 2024-11-07 RX ORDER — FENTANYL CITRATE 50 UG/ML
100 INJECTION, SOLUTION INTRAMUSCULAR; INTRAVENOUS
Status: COMPLETED | OUTPATIENT
Start: 2024-11-07 | End: 2024-11-07

## 2024-11-07 RX ADMIN — WATER 2000 MG: 1 INJECTION INTRAMUSCULAR; INTRAVENOUS; SUBCUTANEOUS at 20:39

## 2024-11-07 RX ADMIN — MIDAZOLAM 2 MG: 1 INJECTION INTRAMUSCULAR; INTRAVENOUS at 07:10

## 2024-11-07 RX ADMIN — EPHEDRINE SULFATE 10 MG: 50 INJECTION INTRAVENOUS at 10:01

## 2024-11-07 RX ADMIN — ONDANSETRON 4 MG: 2 INJECTION INTRAMUSCULAR; INTRAVENOUS at 08:20

## 2024-11-07 RX ADMIN — WATER 2000 MG: 1 INJECTION INTRAMUSCULAR; INTRAVENOUS; SUBCUTANEOUS at 13:32

## 2024-11-07 RX ADMIN — OXYCODONE 5 MG: 5 TABLET ORAL at 19:00

## 2024-11-07 RX ADMIN — FENTANYL CITRATE 25 MCG: 50 INJECTION INTRAMUSCULAR; INTRAVENOUS at 11:34

## 2024-11-07 RX ADMIN — ROPIVACAINE HYDROCHLORIDE 30 ML: 5 INJECTION, SOLUTION EPIDURAL; INFILTRATION; PERINEURAL at 07:26

## 2024-11-07 RX ADMIN — OXYCODONE 5 MG: 5 TABLET ORAL at 22:56

## 2024-11-07 RX ADMIN — PROPOFOL 50 MCG/KG/MIN: 10 INJECTION, EMULSION INTRAVENOUS at 08:04

## 2024-11-07 RX ADMIN — MEPIVACAINE HYDROCHLORIDE 52.5 MG: 15 INJECTION, SOLUTION EPIDURAL; INFILTRATION at 08:12

## 2024-11-07 RX ADMIN — SODIUM CHLORIDE: 9 INJECTION, SOLUTION INTRAVENOUS at 10:51

## 2024-11-07 RX ADMIN — ACETAMINOPHEN 650 MG: 325 TABLET ORAL at 19:00

## 2024-11-07 RX ADMIN — PROPOFOL 50 MG: 10 INJECTION, EMULSION INTRAVENOUS at 08:03

## 2024-11-07 RX ADMIN — HYDROMORPHONE HYDROCHLORIDE 0.5 MG: 1 INJECTION, SOLUTION INTRAMUSCULAR; INTRAVENOUS; SUBCUTANEOUS at 10:53

## 2024-11-07 RX ADMIN — ONDANSETRON 4 MG: 2 INJECTION INTRAMUSCULAR; INTRAVENOUS at 13:27

## 2024-11-07 RX ADMIN — LIDOCAINE HYDROCHLORIDE 40 MG: 20 INJECTION, SOLUTION EPIDURAL; INFILTRATION; INTRACAUDAL; PERINEURAL at 08:03

## 2024-11-07 RX ADMIN — FAMOTIDINE 20 MG: 20 TABLET, FILM COATED ORAL at 20:42

## 2024-11-07 RX ADMIN — HYDROMORPHONE HYDROCHLORIDE 0.5 MG: 1 INJECTION, SOLUTION INTRAMUSCULAR; INTRAVENOUS; SUBCUTANEOUS at 20:13

## 2024-11-07 RX ADMIN — LORAZEPAM 0.25 MG: 2 INJECTION INTRAMUSCULAR at 11:16

## 2024-11-07 RX ADMIN — WATER 2000 MG: 1 INJECTION INTRAMUSCULAR; INTRAVENOUS; SUBCUTANEOUS at 08:26

## 2024-11-07 RX ADMIN — PHENYLEPHRINE HYDROCHLORIDE 60 MCG/MIN: 10 INJECTION INTRAVENOUS at 08:15

## 2024-11-07 RX ADMIN — PROCHLORPERAZINE EDISYLATE 5 MG: 5 INJECTION INTRAMUSCULAR; INTRAVENOUS at 17:52

## 2024-11-07 RX ADMIN — SODIUM CHLORIDE: 9 INJECTION, SOLUTION INTRAVENOUS at 08:59

## 2024-11-07 RX ADMIN — ACETAMINOPHEN 1000 MG: 500 TABLET ORAL at 06:47

## 2024-11-07 RX ADMIN — SODIUM CHLORIDE: 9 INJECTION, SOLUTION INTRAVENOUS at 07:23

## 2024-11-07 RX ADMIN — HYDROMORPHONE HYDROCHLORIDE 0.5 MG: 1 INJECTION, SOLUTION INTRAMUSCULAR; INTRAVENOUS; SUBCUTANEOUS at 11:00

## 2024-11-07 RX ADMIN — HYDROMORPHONE HYDROCHLORIDE 0.5 MG: 1 INJECTION, SOLUTION INTRAMUSCULAR; INTRAVENOUS; SUBCUTANEOUS at 15:07

## 2024-11-07 RX ADMIN — FENTANYL CITRATE 50 MCG: 50 INJECTION INTRAMUSCULAR; INTRAVENOUS at 07:10

## 2024-11-07 RX ADMIN — SODIUM CHLORIDE, PRESERVATIVE FREE 10 ML: 5 INJECTION INTRAVENOUS at 20:43

## 2024-11-07 RX ADMIN — LORAZEPAM 0.25 MG: 2 INJECTION INTRAMUSCULAR; INTRAVENOUS at 11:16

## 2024-11-07 RX ADMIN — WARFARIN SODIUM 5 MG: 5 TABLET ORAL at 19:01

## 2024-11-07 ASSESSMENT — PAIN SCALES - GENERAL
PAINLEVEL_OUTOF10: 6
PAINLEVEL_OUTOF10: 4
PAINLEVEL_OUTOF10: 7
PAINLEVEL_OUTOF10: 5
PAINLEVEL_OUTOF10: 5
PAINLEVEL_OUTOF10: 7
PAINLEVEL_OUTOF10: 6
PAINLEVEL_OUTOF10: 7
PAINLEVEL_OUTOF10: 3
PAINLEVEL_OUTOF10: 6

## 2024-11-07 ASSESSMENT — PAIN DESCRIPTION - LOCATION
LOCATION: KNEE

## 2024-11-07 ASSESSMENT — PAIN DESCRIPTION - ORIENTATION
ORIENTATION: RIGHT

## 2024-11-07 ASSESSMENT — PAIN - FUNCTIONAL ASSESSMENT
PAIN_FUNCTIONAL_ASSESSMENT: NONE - DENIES PAIN
PAIN_FUNCTIONAL_ASSESSMENT: 0-10

## 2024-11-07 ASSESSMENT — PAIN DESCRIPTION - DESCRIPTORS
DESCRIPTORS: ACHING;NAGGING
DESCRIPTORS: SORE
DESCRIPTORS: ACHING
DESCRIPTORS: ACHING
DESCRIPTORS: SORE
DESCRIPTORS: ACHING
DESCRIPTORS: SORE
DESCRIPTORS: SORE

## 2024-11-07 NOTE — PROGRESS NOTES
Pharmacist Note - Warfarin Dosing  Consult provided for this 78 y.o. female to manage warfarin for Post-OP DVT PPX    INR Goal: 1.7- 2.2    Therapy Day: POD0    Preop Dose: Oliveros- none    Drugs that may increase INR: None  Drugs that may decrease INR: None  Other current anticoagulants/ drugs that may increase bleeding risk: None  Risk factors: Age > 65  Daily INR ordered: YES    No results for input(s): \"HGB\", \"INR\" in the last 72 hours.    Date               INR                 Dose  11/7  1.1  2.5    Assessment/ Plan:  Will order warfarin 5 mg PO x 1 dose.    Pharmacy will continue to monitor daily and adjust therapy as indicated.

## 2024-11-07 NOTE — ANESTHESIA PROCEDURE NOTES
Peripheral Block    Patient location during procedure: pre-op  Reason for block: post-op pain management and at surgeon's request  Start time: 11/7/2024 7:22 AM  End time: 11/7/2024 7:26 AM  Staffing  Performed: anesthesiologist   Anesthesiologist: Kartik Hernandes MD  Performed by: Kartik Hernandes MD  Authorized by: Kartik Hernandes MD    Preanesthetic Checklist  Completed: patient identified, IV checked, site marked, risks and benefits discussed, surgical/procedural consents, equipment checked, pre-op evaluation, timeout performed, anesthesia consent given, oxygen available and monitors applied/VS acknowledged  Peripheral Block   Patient position: supine  Prep: ChloraPrep  Provider prep: mask and sterile gloves  Patient monitoring: cardiac monitor, continuous pulse ox, frequent blood pressure checks, IV access and oxygen  Block type: Saphenous  Laterality: right  Injection technique: single-shot  Guidance: ultrasound guided    Needle   Needle type: insulated echogenic nerve stimulator needle   Needle gauge: 21 G  Needle localization: anatomical landmarks and ultrasound guidance  Needle length: 10 cm  Assessment   Injection assessment: negative aspiration for heme, no paresthesia on injection, local visualized surrounding nerve on ultrasound and no intravascular symptoms  Paresthesia pain: none  Slow fractionated injection: yes  Hemodynamics: stable  Outcomes: uncomplicated and patient tolerated procedure well    Medications Administered  ropivacaine (NAROPIN) injection 0.5% - Perineural   30 mL - 11/7/2024 7:26:00 AM

## 2024-11-07 NOTE — H&P
[facility]EX[facility] PRE-OP HISTORY AND PHYSICAL    Subjective:     Patient is a 78 y.o. female presented with a history of right knee pain.  Onset of symptoms was gradual with gradually worsening course since that time. She is being admitted for surgical management of this condition.           Patient Active Problem List    Diagnosis Date Noted    Arthritis of knee, right 07/25/2024    Chronic pain of right knee 05/06/2022    Localized osteoarthritis of right knee 05/06/2022    Gastroesophageal reflux disease without esophagitis 10/12/2018    Chronic allergic rhinitis     Ulcerative pancolitis without complication ([facility]) 01/28/2013    Recurrent major depressive disorder ([facility]) 01/28/2013    Iron deficiency anemia 01/28/2013    HLD (hyperlipidemia) 11/20/2012     Past Medical History:   Diagnosis Date    Allergic rhinitis     Arthritis     CAD (coronary artery disease)     by EBT    Cancer ([facility])     BCC NOSE    Depression     PT STATES THIS WAS A LONG TIME AGO    GERD (gastroesophageal reflux disease)     History of shingles 2011    Iron deficiency anemia     PT DENIES    PONV (postoperative nausea and vomiting)     WITH DIZZINESS PER PT    Ulcerative colitis ([facility])       Past Surgical History:   Procedure Laterality Date    COLONOSCOPY  07/25/2018    chronic colitis    COLONOSCOPY  2013    sigmoid colitis 2018    OTHER SURGICAL HISTORY  2015    BCC removed from tip of the nose     TONSILLECTOMY      UPPER GASTROINTESTINAL ENDOSCOPY  2013    WISDOM TOOTH EXTRACTION        Prior to Admission medications    Medication Sig Start Date End Date Taking? Authorizing Provider   ipratropium (ATROVENT) 0.03 % nasal spray 2 sprays by Each Nostril route as needed for Rhinitis   Yes Yaz Vera MD   polyethyl glycol-propyl glycol 0.4-0.3 % (SYSTANE) 0.4-0.3 % ophthalmic solution Place 1 drop into both eyes as needed for Dry Eyes   Yes Yaz Vera MD   Menaquinone-7 (K2 PO) Take 1 tablet by mouth daily   Yes Provider 
left

## 2024-11-07 NOTE — PROGRESS NOTES
PHYSICAL THERAPY    Attempted to see patient when she arrived on the floor but she was very nauseated. Will check later.    Joanna Calderón/PT

## 2024-11-07 NOTE — PROGRESS NOTES
1519- Patient continues to c/o nausea with some emesis. Administered prn Zofran IV per MAR, given small bites of saltines and gingerale, ice chips, HOB elevated, instructed to make position changes slowly. Patient has been able to get up to the BSC to void twice. IVF continue. Resting in bed with lights off and  at bedside. Messaged provider to see about another antiemetic. Care ongoing.     1746- On call provider ordered compazine and administered IV per MAR.

## 2024-11-07 NOTE — ANESTHESIA PROCEDURE NOTES
Spinal Block    Patient location during procedure: OR  End time: 11/7/2024 8:12 AM  Reason for block: primary anesthetic  Staffing  Performed: resident/CRNA   Resident/CRNA: Bryan Basurto APRN - CRNA  Performed by: Bryan Basurto APRN - CRNA  Authorized by: Kartik Hernandes MD    Spinal Block  Patient position: sitting  Prep: DuraPrep  Patient monitoring: cardiac monitor, continuous pulse ox, continuous capnometry, frequent blood pressure checks and oxygen  Approach: midline  Location: L3/L4  Provider prep: sterile gloves and mask  Needle  Needle type: Sprotte Tip   Needle gauge: 24 G  Needle length: 4 in  Assessment  Swirl obtained: Yes  CSF: clear  Attempts: 2  Hemodynamics: stable  Preanesthetic Checklist  Completed: patient identified, IV checked, site marked, risks and benefits discussed, surgical/procedural consents, equipment checked, pre-op evaluation, timeout performed, anesthesia consent given, oxygen available, monitors applied/VS acknowledged, fire risk safety assessment completed and verbalized and blood product R/B/A discussed and consented

## 2024-11-07 NOTE — DISCHARGE INSTRUCTIONS
[facility] [email or web address]  Total Knee Replacement   Post-Op Discharge Instructions Knee   Dr. Yanet Oliveros    Patient Name  Tamika Timmons  Date of procedure  [unfilled]    Procedure  Procedure(s) with comments:  RIGHT TOTAL KNEE ARTHROPLASTY - RIGHT TOTAL KNEE ARTHROPLASTY  Surgeon  Surgeons and Role:     * Yanet Oliveros MD - Primary  PCP: @PCP@    Follow up care  Follow up visit with Dr. Oliveros in 2-3 weeks.  Call [phone], extension [phone] to make an appointment    Activity at home  Take a short walk every hour; except at night when sleeping  Do your Home Exercise Program 3 times every day   After exercising lie down and elevate your leg on pillows for 15-30 minutes to decrease swelling  Refer to your patient notebook for more information   Preventing blood clots  Take Warfarin (Coumadin) every day as prescribed.  Do not take aspirin or anti-inflammatory medicine while taking Warfarin  Wear elastic stockings (TEDS) for 4 weeks. You may remove them daily for 1 hour when shoering/sponge-bathing.  Call Dr. Oliveros if you have side effects of blood thinning medication: bleeding, bruising, upset stomach, or diarrhea.   Call Dr. Oliveros for signs of a blood clot in your leg: calf pain, tenderness, redness, swelling of lower leg   Preventing lung congestion  Use your incentive spirometer 4 times a day; do 10 repetitions each time  Remember to keep the small blue ball between the two arrows when taking a slow, deep breath   Pain Management  Get up and walk a short distance to relieve pain and stiffness.  Place ice wrap on your knee except when you are walking. The gel ice packs should be changed about every 4 hours  Elevate your leg on pillows for 15-30 minutes   If needed, take a narcotic pain pill every 4-6 hours as prescribed.  Take all medications with a small amount of food.   As your pain decreases, take the narcotics less often or take ½ of a pill  Call Dr. Oliveros if you have side effects from your

## 2024-11-07 NOTE — PERIOP NOTE
TRANSFER - OUT REPORT:    Verbal report given to ANGELITO Jackson on Tamika Timmons  being transferred to [facility] for routine post-op       Report consisted of patient’s Situation, Background, Assessment and   Recommendations(SBAR).     Time Pre op antibiotic given:0826  Anesthesia Stop time: 1041    Information from the following report(s) SBAR, OR Summary, Intake/Output, and MAR was reviewed with the receiving nurse.    Opportunity for questions and clarification was provided.     Is the patient on 02? Yes       L/Min 1       Other x    Is the patient on a monitor? No    Is the nurse transporting with the patient? No    At transfer, are there Patient Belongings with the patient?  If Yes, please note/list: bag of belongings      Surgical Waiting Area notified of patient's transfer from PACU? Yes

## 2024-11-07 NOTE — ANESTHESIA POSTPROCEDURE EVALUATION
Department of Anesthesiology  Postprocedure Note    Patient: Tamika Timmons  MRN: [id number]  YOB: 1945  Date of evaluation: 11/7/2024    Procedure Summary       Date: 11/07/24 Room / Location: [facility] MAIN OR [address] [place] [address] MAIN OR    Anesthesia Start: 0755 Anesthesia Stop: 1041    Procedure: RIGHT TOTAL KNEE ARTHROPLASTY (Right: Knee) Diagnosis:       Arthritis of knee, right      (Arthritis of knee, right [M17.11])    Providers: Yanet Oliveros MD Responsible Provider: Kartik Hernandes MD    Anesthesia Type: MAC, Spinal, Regional ASA Status: 2            Anesthesia Type: MAC, Spinal, Regional    Praveen Phase I: Praveen Score: 8    Praveen Phase II:      Anesthesia Post Evaluation    Patient location during evaluation: PACU  Patient participation: complete - patient participated  Level of consciousness: awake  Airway patency: patent  Nausea & Vomiting: no nausea  Cardiovascular status: blood pressure returned to baseline and hemodynamically stable  Respiratory status: acceptable  Hydration status: stable  Multimodal analgesia pain management approach    No notable events documented.

## 2024-11-07 NOTE — BRIEF OP NOTE
Brief Postoperative Note      Patient: Tamika Timmons  YOB: 1945  MRN: [id number]    Date of Procedure: 11/7/2024    Pre-Op Diagnosis Codes:      * Arthritis of knee, right [M17.11]    Post-Op Diagnosis: Same       Procedure(s):  RIGHT TOTAL KNEE ARTHROPLASTY    Surgeon(s):  Yanet Oliveros MD Wie, Benjamin J, MD    Assistant:  Physician Assistant: Divine Mcleod PA-C    Anesthesia: spinal with mac    Estimated Blood Loss (mL): 200     Complications: None    Specimens:   Bone discarded    Implants:  Implant Name Type Inv. Item Serial No.  Lot No. LRB No. Used Action   COMPONENT FEM SZ 1.5 RT CRUC RET BETTY TRULIANT - [id number]  COMPONENT FEM SZ 1.5 RT CRUC RET BETTY TRULIANT [id number] [employer] NA Right 1 Implanted   COMPONENT TIB BETTY FIT 1.5F/1.5T LOGIC - [id number]  COMPONENT TIB BETTY FIT 1.5F/1.5T LOGIC [id number] [employer] NA Right 1 Implanted   COMPONENT PATELLAR ACTIVIT-E BETTY 3 PEG STRL TRULIANT - [id number]  COMPONENT PATELLAR ACTIVIT-E BETTY 3 PEG STRL TRULIANT [id number] [employer] NA Right 1 Implanted   CEMENT BNE MED VISC 80 GM GENTAMICIN PALACOS MV+G PRO - SNA  CEMENT BNE MED VISC 80 GM GENTAMICIN PALACOS MV+G PRO NA [employer] [id number] Right 1 Implanted   INSERT TIB THK 10 MM SZ 1.5 ACTIVIT-E KNEE CR CONSTRND STRL - [id number]  INSERT TIB THK 10 MM SZ 1.5 ACTIVIT-E KNEE CR CONSTRND STRL [id number] [employer] NA Right 1 Implanted             Electronically signed by Yanet Oliveros MD on 11/7/2024 at 10:06 AM

## 2024-11-08 LAB
COMMENT:: NORMAL
INR PPP: 1 (ref 0.9–1.1)
PROTHROMBIN TIME: 10.9 SEC (ref 9–11.1)
SPECIMEN HOLD: NORMAL

## 2024-11-08 PROCEDURE — 97116 GAIT TRAINING THERAPY: CPT

## 2024-11-08 PROCEDURE — 96374 THER/PROPH/DIAG INJ IV PUSH: CPT

## 2024-11-08 PROCEDURE — 6360000002 HC RX W HCPCS: Performed by: ORTHOPAEDIC SURGERY

## 2024-11-08 PROCEDURE — 2700000000 HC OXYGEN THERAPY PER DAY

## 2024-11-08 PROCEDURE — 97530 THERAPEUTIC ACTIVITIES: CPT

## 2024-11-08 PROCEDURE — 2580000003 HC RX 258: Performed by: ORTHOPAEDIC SURGERY

## 2024-11-08 PROCEDURE — G0378 HOSPITAL OBSERVATION PER HR: HCPCS

## 2024-11-08 PROCEDURE — 6370000000 HC RX 637 (ALT 250 FOR IP): Performed by: ORTHOPAEDIC SURGERY

## 2024-11-08 PROCEDURE — 85610 PROTHROMBIN TIME: CPT

## 2024-11-08 RX ORDER — WARFARIN SODIUM 5 MG/1
2.5 TABLET ORAL
Status: COMPLETED | OUTPATIENT
Start: 2024-11-08 | End: 2024-11-08

## 2024-11-08 RX ORDER — ONDANSETRON 4 MG/1
4 TABLET, FILM COATED ORAL DAILY PRN
Qty: 30 TABLET | Refills: 0 | Status: SHIPPED | OUTPATIENT
Start: 2024-11-08

## 2024-11-08 RX ADMIN — CETIRIZINE HYDROCHLORIDE 5 MG: 10 TABLET ORAL at 09:09

## 2024-11-08 RX ADMIN — SODIUM CHLORIDE, PRESERVATIVE FREE 10 ML: 5 INJECTION INTRAVENOUS at 20:38

## 2024-11-08 RX ADMIN — ACETAMINOPHEN 650 MG: 325 TABLET ORAL at 18:19

## 2024-11-08 RX ADMIN — OXYCODONE 10 MG: 5 TABLET ORAL at 14:59

## 2024-11-08 RX ADMIN — FAMOTIDINE 20 MG: 20 TABLET, FILM COATED ORAL at 09:04

## 2024-11-08 RX ADMIN — ONDANSETRON 4 MG: 2 INJECTION INTRAMUSCULAR; INTRAVENOUS at 11:27

## 2024-11-08 RX ADMIN — ACETAMINOPHEN 650 MG: 325 TABLET ORAL at 12:42

## 2024-11-08 RX ADMIN — OXYCODONE 5 MG: 5 TABLET ORAL at 03:09

## 2024-11-08 RX ADMIN — OXYCODONE 5 MG: 5 TABLET ORAL at 23:11

## 2024-11-08 RX ADMIN — SODIUM CHLORIDE, PRESERVATIVE FREE 10 ML: 5 INJECTION INTRAVENOUS at 09:04

## 2024-11-08 RX ADMIN — ACETAMINOPHEN 650 MG: 325 TABLET ORAL at 05:44

## 2024-11-08 RX ADMIN — FAMOTIDINE 20 MG: 20 TABLET, FILM COATED ORAL at 20:38

## 2024-11-08 RX ADMIN — WARFARIN SODIUM 2.5 MG: 5 TABLET ORAL at 18:19

## 2024-11-08 RX ADMIN — OXYCODONE 10 MG: 5 TABLET ORAL at 19:06

## 2024-11-08 RX ADMIN — OXYCODONE 5 MG: 5 TABLET ORAL at 08:58

## 2024-11-08 ASSESSMENT — PAIN SCALES - GENERAL
PAINLEVEL_OUTOF10: 6
PAINLEVEL_OUTOF10: 0
PAINLEVEL_OUTOF10: 6
PAINLEVEL_OUTOF10: 0
PAINLEVEL_OUTOF10: 4
PAINLEVEL_OUTOF10: 10
PAINLEVEL_OUTOF10: 3
PAINLEVEL_OUTOF10: 6
PAINLEVEL_OUTOF10: 7
PAINLEVEL_OUTOF10: 4
PAINLEVEL_OUTOF10: 0
PAINLEVEL_OUTOF10: 1

## 2024-11-08 ASSESSMENT — PAIN DESCRIPTION - DESCRIPTORS
DESCRIPTORS: ACHING;DISCOMFORT
DESCRIPTORS: ACHING;DISCOMFORT;DULL;GNAWING

## 2024-11-08 ASSESSMENT — PAIN DESCRIPTION - LOCATION
LOCATION: KNEE;LEG
LOCATION: KNEE
LOCATION: LEG;KNEE
LOCATION: KNEE;LEG

## 2024-11-08 ASSESSMENT — PAIN DESCRIPTION - ORIENTATION
ORIENTATION: RIGHT

## 2024-11-08 ASSESSMENT — PAIN SCALES - WONG BAKER
WONGBAKER_NUMERICALRESPONSE: NO HURT

## 2024-11-08 NOTE — OP NOTE
The proximal tibia was neutralized and set at 5 degrees of slope.  Our depth was then set.  We checked this three times before the cut was made.  Navigation was used for this.  We used a reciprocating saw followed by sagittal saw to remove both the medial and lateral bone.  Any bone around the PCL was carefully removed with a rongeur.  Satisfied with the cut, we then checked our extension gap.  Our extension gap seemed to be somewhere around 9 or 10.  The knee was flexed up.  The 4-in-1 cutting block for the 1.5 was then attached.  We then cut the bone respectively.  The posterior compartment was debrided of any meniscal remnants or any bony debris.  We then injected Exparel in the posterior compartment.  The 1.5 trial femur was inserted.  It was held in place with bone peg.  We then floated the size 9 poly in place.  The 9 fit very well and she had really good balance with varus and valgus stressing, however, was just slightly tight, so we kept this 9 and flexed the knee up and then used a percutaneous 18-gauge needle technique to loosen the PCL.  Following this, we bumped this up to a size 10 and the size 10 worked perfectly.  The lug nuts in the distal femur were then drilled.  All the bony pegs were removed.  We then watched the tracking of the patella and the tracking was perfect.  Our trial components were removed.  We hyperflexed the knee.  The size 1.5 was appropriate for the tibia.  The cup block for the tibia was placed and held in place with bone pegs.  We then drilled and broached in standard fashion.  Antibiotic impregnated cement was mixed on the back table while we irrigated with IrriSept and pulse irrigation.  The bone stock was dried.  We cemented the tibia followed by the femur and placed a 10 mm CRC trial poly in place for compression.  The excess cement was removed.  We then extended the knee and cemented the patella.  The rest of the Exparel was injected to the anterior compartment of the knee.

## 2024-11-08 NOTE — CARE COORDINATION
Care Management Initial Assessment       RUR:  Readmission? {NO/YES:[id number]}  1st IM letter given? {NO/YES: [id number]}  1st  letter given: {NO/YES: [id number]}     11/08/24 5783   Service Assessment   Patient Orientation Alert and Oriented;Person;Place;Situation   Cognition Alert   History Provided By Patient   Primary Caregiver Self   Support Systems Spouse/Significant Other   Patient's Healthcare Decision Maker is: Legal Next of Kin   PCP Verified by CM Yes   Last Visit to PCP Within last 3 months   Prior Functional Level Independent in ADLs/IADLs   Current Functional Level Assistance with the following:;Mobility;Shopping;Housework;Cooking   Can patient return to prior living arrangement Yes   Ability to make needs known: Good   Family able to assist with home care needs: Yes   Financial Resources Medicare   Social/Functional History   Lives With Spouse   Type of Home House   Home Layout Multi-level   Home Access Stairs to enter with rails   Entrance Stairs - Number of Steps 5   Home Equipment Walker - Rolling   Occupation Retired   Discharge Planning   Type of Residence House   Living Arrangements Spouse/Significant Other   Current Services Prior To Admission None   Potential Assistance Needed Home Care   DME Ordered? No   Potential Assistance Purchasing Medications No   Type of Home Care Services Nursing Services;PT   Patient expects to be discharged to: House   Services At/After Discharge   Transition of Care Consult (CM Consult) Home Health;Discharge Planning   Services At/After Discharge Home Health   Confirm Follow Up Transport Family   Condition of Participation: Discharge Planning   The Plan for Transition of Care is related to the following treatment goals: HH   The Patient and/or Patient Representative was provided with a Choice of Provider? Patient   The Patient and/Or Patient Representative agree with the Discharge Plan? Yes   Freedom of Choice list was provided with basic dialogue that

## 2024-11-08 NOTE — PROGRESS NOTES
POD 1 Day Post-Op    Procedure:  Procedure(s) with comments:  RIGHT TOTAL KNEE ARTHROPLASTY - RIGHT TOTAL KNEE ARTHROPLASTY    Subjective:     Patient doing ok.  Having nausea and ok pain controlled    Objective:     Blood pressure 137/73, pulse 97, temperature 97.9 °F (36.6 °C), temperature source Oral, resp. rate 18, SpO2 98%.    Temp (24hrs), Av.8 °F (36.6 °C), Min:97.2 °F (36.2 °C), Max:98.4 °F (36.9 °C)      Physical Exam:  Examination of the right knee reveals that the incision is clean, dry and intact. Sensation is intact to light touch.  mild swelling.  no calf pain.  NVI    Labs:   Lab Results   Component Value Date/Time    INR 1.0 2024 04:41 AM         Assessment:     Principal Problem:    Arthritis of knee, right  Active Problems:    Primary osteoarthritis of right knee    Arthritis of right knee  Resolved Problems:    * No resolved hospital problems. *      Plan/Recommendations/Medical Decision Making:     Continue physical therapy  Ice and elevate  Continue coumadin for DVT prophylaxis  Probably home today           This is a 42 year old female with PMH: + current Smoker, BRCA positive: s/p (6/2018): prophylactic Bilateral MRM/ bilateral insertion Tissue expanders: pathology: negative Breast Cancer. dx: (7/2018): Staph Infection in Left Breast: Left Tissue Expander removed, treated with antibiotics, infection resolved. Now scheduled: Left Breast Revision of Reconstruction with Insertion of Tissue Expander and Placement of Alloderm.

## 2024-11-09 LAB
INR PPP: 2.2 (ref 0.9–1.1)
PROTHROMBIN TIME: 22.3 SEC (ref 9–11.1)

## 2024-11-09 PROCEDURE — G0378 HOSPITAL OBSERVATION PER HR: HCPCS

## 2024-11-09 PROCEDURE — 97530 THERAPEUTIC ACTIVITIES: CPT

## 2024-11-09 PROCEDURE — 6370000000 HC RX 637 (ALT 250 FOR IP): Performed by: ORTHOPAEDIC SURGERY

## 2024-11-09 PROCEDURE — 2580000003 HC RX 258: Performed by: ORTHOPAEDIC SURGERY

## 2024-11-09 PROCEDURE — 85610 PROTHROMBIN TIME: CPT

## 2024-11-09 PROCEDURE — 36415 COLL VENOUS BLD VENIPUNCTURE: CPT

## 2024-11-09 PROCEDURE — 97116 GAIT TRAINING THERAPY: CPT

## 2024-11-09 RX ADMIN — SODIUM CHLORIDE, PRESERVATIVE FREE 10 ML: 5 INJECTION INTRAVENOUS at 09:11

## 2024-11-09 RX ADMIN — OXYCODONE 5 MG: 5 TABLET ORAL at 10:27

## 2024-11-09 RX ADMIN — ACETAMINOPHEN 650 MG: 325 TABLET ORAL at 13:18

## 2024-11-09 RX ADMIN — OXYCODONE 5 MG: 5 TABLET ORAL at 21:20

## 2024-11-09 RX ADMIN — SODIUM CHLORIDE, PRESERVATIVE FREE 10 ML: 5 INJECTION INTRAVENOUS at 21:36

## 2024-11-09 RX ADMIN — CETIRIZINE HYDROCHLORIDE 5 MG: 10 TABLET ORAL at 09:10

## 2024-11-09 RX ADMIN — ACETAMINOPHEN 650 MG: 325 TABLET ORAL at 19:29

## 2024-11-09 RX ADMIN — OXYCODONE 5 MG: 5 TABLET ORAL at 18:11

## 2024-11-09 RX ADMIN — OXYCODONE 5 MG: 5 TABLET ORAL at 05:28

## 2024-11-09 RX ADMIN — FAMOTIDINE 20 MG: 20 TABLET, FILM COATED ORAL at 09:11

## 2024-11-09 RX ADMIN — ACETAMINOPHEN 650 MG: 325 TABLET ORAL at 05:27

## 2024-11-09 RX ADMIN — FAMOTIDINE 20 MG: 20 TABLET, FILM COATED ORAL at 21:20

## 2024-11-09 ASSESSMENT — PAIN DESCRIPTION - ORIENTATION
ORIENTATION: RIGHT

## 2024-11-09 ASSESSMENT — PAIN DESCRIPTION - LOCATION
LOCATION: KNEE

## 2024-11-09 ASSESSMENT — PAIN DESCRIPTION - DESCRIPTORS
DESCRIPTORS: ACHING;THROBBING
DESCRIPTORS: ACHING;THROBBING
DESCRIPTORS: ACHING
DESCRIPTORS: ACHING;THROBBING
DESCRIPTORS: ACHING

## 2024-11-09 ASSESSMENT — PAIN DESCRIPTION - PAIN TYPE: TYPE: SURGICAL PAIN

## 2024-11-09 ASSESSMENT — PAIN SCALES - GENERAL
PAINLEVEL_OUTOF10: 8
PAINLEVEL_OUTOF10: 5
PAINLEVEL_OUTOF10: 6
PAINLEVEL_OUTOF10: 6
PAINLEVEL_OUTOF10: 5
PAINLEVEL_OUTOF10: 5
PAINLEVEL_OUTOF10: 3

## 2024-11-09 NOTE — DISCHARGE SUMMARY
Orthopedic Discharge Summary       Patient ID:  Name:Tamika Timmons Date: 11/10/2024 7:47 AM   MRN#: [id number] :1945   Admission Date:2024 Age/Sex:78 y.o. female       Admit date: 2024    Discharge date:     Admitting Physician: Yanet Oliveros MD     Admission Diagnoses: Arthritis of knee, right [M17.11]  Primary osteoarthritis of right knee [M17.11]  Arthritis of right knee [M17.11]    Discharge Diagnoses: same    Discharged Condition: good    Consults: none    Procedures Performed: Procedure(s) (LRB):  RIGHT TOTAL KNEE ARTHROPLASTY (Right)    Hospital Course: Pt is a 78 y.o. female Pt underwent Procedure(s) (LRB):  RIGHT TOTAL KNEE ARTHROPLASTY (Right) performed by Dr. Yanet Oliveros MD . Pt tolerated the procedure well and was transferred to the post anesthesia care unit in stable condition. After a brief stay in the PACU the pt was transfer to medical surgical unit. During the patients hospital stay they received the benefit of DVT prophylaxis, pain control with both PO and IV pain medication, prophylactic antibiotics and daily therapy. Pt hospital stay was uncomplicated and on the date of discharge pt was tolerating a regular diet, was able to participate with physical therapy and had adequate pain control. Patient was discharged 11/10    Disposition: Discharge home    Patient Instructions:   [unfilled]  Discharge Procedure Orders   Instruct patient on pre-procedure shower/scrub   Standing Status: Future Standing Exp. Date: 25   Order Comments: Instruct patient on hospital specific pre-procedure shower/scrub directions     Initiate PAT Protocol   Standing Status: Future Standing Exp. Date: 25     Follow-up 10 to 14 days    Signed:  Milton Rivas DO  11/10/2024  7:47 AM

## 2024-11-09 NOTE — PROGRESS NOTES
Pharmacist Note - Warfarin Dosing  Consult provided for this 78 y.o. female to manage warfarin for Post-op proph    INR Goal: Other- 1.5 -2    Therapy Day: 3    Preop Dose: Oliveros- none    Drugs that may increase INR: None  Drugs that may decrease INR: None  Other current anticoagulants/ drugs that may increase bleeding risk: None  Risk factors: Age > 65  Daily INR ordered: YES    Recent Labs     11/07/24  1620 11/08/24  0441 11/09/24  0504   INR 1.0 1.0 2.2*       Date               INR                 Dose  11/7  1.0  5 mg  11/8                 1.0                  2.5 mg  11/9                 2.2                  Hold    Assessment/ Plan:  Will hold dose today due to rapid increase in INR (supra-therapeutic for target range).    Pharmacy will continue to monitor daily and adjust therapy as indicated.

## 2024-11-09 NOTE — PROGRESS NOTES
Orthopedic Surgery Progress Note:    Subjective:  Patient seen and examined this AM. Discussed post operative plans in detail. Pain under control. Denies chest pain, shortness breath, nausea/vomiting.    Objective:  Vitals:    11/09/24 0218 11/09/24 0528 11/09/24 0914 11/09/24 1057   BP: (!) 126/54  136/72    Pulse: 99  99    Resp: 19 18 17 18   Temp: 99.3 °F (37.4 °C)  98.2 °F (36.8 °C)    TempSrc: Oral  Oral    SpO2: 93%  95%      Recent Labs     11/07/24  1620 11/08/24  0441 11/09/24  0504   INR 1.0 1.0 2.2*      Musculoskeletal:     RLE  Dressing c/d/i  SILT of distal extremity  Active dorsiflexion/plantarflexion and Toe flx/ext intact  Brisk capillary refill in all toes  DP and PT +2  Compartments soft and compressible    Assessment:   78 y.o. female S/p right total knee arthroplasty    Plan:  Multimodal pain control  DVT prophylaxis  PT  Weight-bear as tolerated  Plan for discharge home today    Milton Rivas, DO  Orthopedic Surgery

## 2024-11-10 VITALS
HEART RATE: 92 BPM | TEMPERATURE: 97.9 F | SYSTOLIC BLOOD PRESSURE: 145 MMHG | RESPIRATION RATE: 18 BRPM | OXYGEN SATURATION: 96 % | DIASTOLIC BLOOD PRESSURE: 78 MMHG

## 2024-11-10 LAB
INR PPP: 1.6 (ref 0.9–1.1)
PROTHROMBIN TIME: 15.8 SEC (ref 9–11.1)

## 2024-11-10 PROCEDURE — G0378 HOSPITAL OBSERVATION PER HR: HCPCS

## 2024-11-10 PROCEDURE — 97110 THERAPEUTIC EXERCISES: CPT

## 2024-11-10 PROCEDURE — 36415 COLL VENOUS BLD VENIPUNCTURE: CPT

## 2024-11-10 PROCEDURE — 85610 PROTHROMBIN TIME: CPT

## 2024-11-10 PROCEDURE — 2580000003 HC RX 258: Performed by: ORTHOPAEDIC SURGERY

## 2024-11-10 PROCEDURE — 6370000000 HC RX 637 (ALT 250 FOR IP): Performed by: ORTHOPAEDIC SURGERY

## 2024-11-10 PROCEDURE — 97530 THERAPEUTIC ACTIVITIES: CPT

## 2024-11-10 RX ORDER — WARFARIN SODIUM 5 MG/1
2.5 TABLET ORAL
Status: COMPLETED | OUTPATIENT
Start: 2024-11-10 | End: 2024-11-10

## 2024-11-10 RX ADMIN — OXYCODONE 5 MG: 5 TABLET ORAL at 10:13

## 2024-11-10 RX ADMIN — ACETAMINOPHEN 650 MG: 325 TABLET ORAL at 11:24

## 2024-11-10 RX ADMIN — OXYCODONE 5 MG: 5 TABLET ORAL at 02:59

## 2024-11-10 RX ADMIN — ACETAMINOPHEN 650 MG: 325 TABLET ORAL at 07:31

## 2024-11-10 RX ADMIN — WARFARIN SODIUM 2.5 MG: 5 TABLET ORAL at 13:21

## 2024-11-10 RX ADMIN — CETIRIZINE HYDROCHLORIDE 5 MG: 10 TABLET ORAL at 10:13

## 2024-11-10 RX ADMIN — FAMOTIDINE 20 MG: 20 TABLET, FILM COATED ORAL at 10:14

## 2024-11-10 RX ADMIN — OXYCODONE 5 MG: 5 TABLET ORAL at 14:34

## 2024-11-10 RX ADMIN — SODIUM CHLORIDE, PRESERVATIVE FREE 10 ML: 5 INJECTION INTRAVENOUS at 10:15

## 2024-11-10 ASSESSMENT — PAIN DESCRIPTION - DESCRIPTORS
DESCRIPTORS: ACHING;CRAMPING
DESCRIPTORS: ACHING;DISCOMFORT
DESCRIPTORS: THROBBING
DESCRIPTORS: ACHING
DESCRIPTORS: ACHING

## 2024-11-10 ASSESSMENT — PAIN - FUNCTIONAL ASSESSMENT
PAIN_FUNCTIONAL_ASSESSMENT: ACTIVITIES ARE NOT PREVENTED

## 2024-11-10 ASSESSMENT — PAIN DESCRIPTION - ORIENTATION
ORIENTATION: RIGHT
ORIENTATION: RIGHT

## 2024-11-10 ASSESSMENT — PAIN SCALES - GENERAL
PAINLEVEL_OUTOF10: 4
PAINLEVEL_OUTOF10: 3
PAINLEVEL_OUTOF10: 6
PAINLEVEL_OUTOF10: 5
PAINLEVEL_OUTOF10: 6

## 2024-11-10 ASSESSMENT — PAIN DESCRIPTION - LOCATION
LOCATION: KNEE

## 2024-11-10 ASSESSMENT — PAIN DESCRIPTION - PAIN TYPE: TYPE: SURGICAL PAIN

## 2024-11-10 NOTE — PROGRESS NOTES
Pharmacist Note - Warfarin Dosing  Consult provided for this 78 y.o. female to manage warfarin for Post-op proph    INR Goal: Other- 1.5 -2    Therapy Day: 4    Preop Dose: Oliveros- none    Drugs that may increase INR: None  Drugs that may decrease INR: None  Other current anticoagulants/ drugs that may increase bleeding risk: None  Risk factors: Age > 65  Daily INR ordered: YES    Recent Labs     11/08/24  0441 11/09/24  0504 11/10/24  0537   INR 1.0 2.2* 1.6*       Date               INR                 Dose  11/7  1.0  5 mg  11/8                 1.0                  2.5 mg  11/9                 2.2                  Hold  11/10               1.6                  2.5 mg    Assessment/ Plan:  Will order warfarin 2.5 mg PO x 1.    Pharmacy will continue to monitor daily and adjust therapy as indicated.

## 2024-11-10 NOTE — PROGRESS NOTES
Discussed with pt -  planned discharge today  - pt reporting right knee pain 4/10 and pt amb to bathroom with nursing supervision.  Pt cleared from PT 11/9 for functional amb and stairs.  Pt defers PT session prior to discharge - pt feels ready for discharge and wants to save energy for 45 min drive home and stairs to enter home.    Leia Lowe, PT

## (undated) DEVICE — CEMENT BNE MED VISC 80 GM GENTAMICIN PALACOS MV+G PRO: Type: IMPLANTABLE DEVICE | Site: KNEE | Status: NON-FUNCTIONAL

## (undated) DEVICE — SOLUTION SURG PREP 26 CC PURPREP

## (undated) DEVICE — COVER,MAYO STAND,STERILE: Brand: MEDLINE

## (undated) DEVICE — INTENT OT USE PROVIDES A STERILE INTERFACE BETWEEN THE OPERATING ROOM SURGICAL LAMPS (NON-STERILE) AND THE SURGEON OR STAFF WORKING IN THE STERILE FIELD.: Brand: ASPEN® ALC PLUS LIGHT HANDLE COVER

## (undated) DEVICE — TOTAL JOINT - SMH: Brand: MEDLINE INDUSTRIES, INC.

## (undated) DEVICE — SUTURE VICRYL ABSORBABLE BRAIDED 2-0 CT 36 IN DA UD  VCP957H

## (undated) DEVICE — SOLUTION IRRIG 1000ML STRL H2O USP PLAS POUR BTL

## (undated) DEVICE — PENCIL SMK EVAC 10 FT BLADE ELECTRD ROCKER FOR TELSCP

## (undated) DEVICE — SPONGE GZ W4XL4IN COT 12 PLY TYP VII WVN C FLD DSGN STERILE

## (undated) DEVICE — 2108 SERIES SAGITTAL BLADE AGGRESSIVE  (25.0 X 1.19 X 85.0MM)

## (undated) DEVICE — DRAPE,EXTREMITY,89X128,STERILE: Brand: MEDLINE

## (undated) DEVICE — GLOVE ORANGE PI 8 1/2   MSG9085

## (undated) DEVICE — PADDING CAST W6INXL4YD NONSTERILE COT RAYON MICROPLEATED

## (undated) DEVICE — SCRUBIN SCRUB BRUSH DRY STER: Brand: MEDLINE INDUSTRIES, INC.

## (undated) DEVICE — 3M™ STERI-DRAPE™ U-DRAPE 1015: Brand: STERI-DRAPE™

## (undated) DEVICE — RECIPROCATING BLADE HEAVY DUTY LONG, OFFSET  (77.6 X 0.77 X 11.2MM)

## (undated) DEVICE — GOWN,NON-REINFORCED,XXL: Brand: MEDLINE

## (undated) DEVICE — SYRINGE 20ML LL S/C 50

## (undated) DEVICE — BANDAGE COMPR M W6INXL10YD WHT BGE VELC E MTRX HK AND LOOP

## (undated) DEVICE — PIN EXT FIX SCHNZ 3 MM

## (undated) DEVICE — 450 ML BOTTLE OF 0.05% CHLORHEXIDINE GLUCONATE IN 99.95% STERILE WATER FOR IRRIGATION, USP AND APPLICATOR.: Brand: IRRISEPT ANTIMICROBIAL WOUND LAVAGE

## (undated) DEVICE — CUSTOM CAST PD STR

## (undated) DEVICE — SOLUTION IRRIG 3000ML 0.9% SOD CHL USP UROMATIC PLAS CONT

## (undated) DEVICE — HYPODERMIC SAFETY NEEDLE: Brand: MAGELLAN

## (undated) DEVICE — GARMENT,MEDLINE,DVT,INT,CALF,MED, GEN2: Brand: MEDLINE

## (undated) DEVICE — SUTURE MCRYL + SZ 4-0 L27IN ABSRB UD PS1 L24MM 3/8 CIR REV MCP935H

## (undated) DEVICE — SUTURE STRATAFIX SYMMETRIC PDS + SZ 1 L18IN ABSRB VLT L48MM SXPP1A400

## (undated) DEVICE — HANDPIECE SET WITH BONE CLEANING TIP AND SUCTION TUBE: Brand: INTERPULSE

## (undated) DEVICE — SUTURE VICRYL COATED ABSORBABLE BRAIDED 2-0 TP1 54 IN COAT UD VICRYL + VCP880T

## (undated) DEVICE — GOWN,SIRUS,NONRNF,SETINSLV,2XL,18/CS: Brand: MEDLINE

## (undated) DEVICE — TUBING SUCT 12FR MAL ALUM SHFT FN CAP VENT UNIV CONN W/ OBT

## (undated) DEVICE — STRYKER PERFORMANCE SERIES SAGITTAL BLADE: Brand: STRYKER PERFORMANCE SERIES

## (undated) DEVICE — ZIMMER® STERILE DISPOSABLE TOURNIQUET CUFF WITH PLC, DUAL PORT, SINGLE BLADDER, 34 IN. (86 CM)

## (undated) DEVICE — YANKAUER,FLEXIBLE HANDLE,REGLR CAPACITY: Brand: MEDLINE INDUSTRIES, INC.

## (undated) DEVICE — DRESSING STERILE PETRO W3XL8IN N ADH OIL EMUL GZ CURAD

## (undated) DEVICE — PAD,ABDOMINAL,5"X9",ST,LF,25/BX: Brand: MEDLINE INDUSTRIES, INC.

## (undated) DEVICE — STRIP,CLOSURE,WOUND,MEDI-STRIP,1/2X4: Brand: MEDLINE

## (undated) DEVICE — CONTAINER,SPECIMEN,3OZ,OR STRL: Brand: MEDLINE

## (undated) DEVICE — PACK SURG PROC KNEE USER GPS